# Patient Record
Sex: FEMALE | Race: WHITE | Employment: OTHER | URBAN - METROPOLITAN AREA
[De-identification: names, ages, dates, MRNs, and addresses within clinical notes are randomized per-mention and may not be internally consistent; named-entity substitution may affect disease eponyms.]

---

## 2017-03-15 ENCOUNTER — HOSPITAL ENCOUNTER (EMERGENCY)
Facility: HOSPITAL | Age: 82
Discharge: HOME | End: 2017-03-15
Attending: EMERGENCY MEDICINE
Payer: MEDICARE

## 2017-03-15 ENCOUNTER — APPOINTMENT (OUTPATIENT)
Dept: CT IMAGING | Facility: HOSPITAL | Age: 82
End: 2017-03-15
Payer: MEDICARE

## 2017-03-15 VITALS
HEIGHT: 64 IN | WEIGHT: 130 LBS | BODY MASS INDEX: 22.2 KG/M2 | OXYGEN SATURATION: 100 % | SYSTOLIC BLOOD PRESSURE: 169 MMHG | RESPIRATION RATE: 20 BRPM | TEMPERATURE: 98 F | DIASTOLIC BLOOD PRESSURE: 62 MMHG | HEART RATE: 76 BPM

## 2017-03-15 DIAGNOSIS — E87.1 HYPONATREMIA: ICD-10-CM

## 2017-03-15 DIAGNOSIS — R42 DIZZINESS: Primary | ICD-10-CM

## 2017-03-15 LAB
ANION GAP SERPL CALC-SCNC: 9 MMOL/L (ref 0–18)
BASOPHILS # BLD: 0.1 K/UL (ref 0–0.2)
BASOPHILS NFR BLD: 1 %
BILIRUB UR QL: NEGATIVE
BUN SERPL-MCNC: 33 MG/DL (ref 8–20)
BUN/CREAT SERPL: 28.9 (ref 10–20)
CALCIUM SERPL-MCNC: 9.1 MG/DL (ref 8.5–10.5)
CHLORIDE SERPL-SCNC: 92 MMOL/L (ref 95–110)
CLARITY UR: CLEAR
CO2 SERPL-SCNC: 25 MMOL/L (ref 22–32)
COLOR UR: COLORLESS
CREAT SERPL-MCNC: 1.14 MG/DL (ref 0.5–1.5)
CREAT UR-MCNC: 21 MG/DL
EOSINOPHIL # BLD: 0.1 K/UL (ref 0–0.7)
EOSINOPHIL NFR BLD: 1 %
ERYTHROCYTE [DISTWIDTH] IN BLOOD BY AUTOMATED COUNT: 14 % (ref 11–15)
GLUCOSE SERPL-MCNC: 105 MG/DL (ref 70–99)
GLUCOSE UR-MCNC: NEGATIVE MG/DL
HCT VFR BLD AUTO: 31.6 % (ref 35–48)
HGB BLD-MCNC: 10.6 G/DL (ref 12–16)
KETONES UR-MCNC: NEGATIVE MG/DL
LYMPHOCYTES # BLD: 1.2 K/UL (ref 1–4)
LYMPHOCYTES NFR BLD: 17 %
MAGNESIUM SERPL-MCNC: 1.9 MG/DL (ref 1.8–2.5)
MCH RBC QN AUTO: 28.7 PG (ref 27–32)
MCHC RBC AUTO-ENTMCNC: 33.6 G/DL (ref 32–37)
MCV RBC AUTO: 85.3 FL (ref 80–100)
MONOCYTES # BLD: 0.9 K/UL (ref 0–1)
MONOCYTES NFR BLD: 12 %
NEUTROPHILS # BLD AUTO: 5.2 K/UL (ref 1.8–7.7)
NEUTROPHILS NFR BLD: 70 %
NITRITE UR QL STRIP.AUTO: NEGATIVE
OSMOLALITY UR CALC.SUM OF ELEC: 270 MOSM/KG (ref 275–295)
PH UR: 5 [PH] (ref 5–8)
PLATELET # BLD AUTO: 239 K/UL (ref 140–400)
PMV BLD AUTO: 8.3 FL (ref 7.4–10.3)
POTASSIUM SERPL-SCNC: 4 MMOL/L (ref 3.3–5.1)
PROT UR-MCNC: NEGATIVE MG/DL
RBC # BLD AUTO: 3.7 M/UL (ref 3.7–5.4)
RBC #/AREA URNS AUTO: 0 /HPF
SODIUM SERPL-SCNC: 126 MMOL/L (ref 136–144)
SODIUM UR-SCNC: 46 MMOL/L
SP GR UR STRIP: 1.01 (ref 1–1.03)
TROPONIN I SERPL-MCNC: 0 NG/ML (ref ?–0.03)
UROBILINOGEN UR STRIP-ACNC: <2
VIT C UR-MCNC: NEGATIVE MG/DL
WBC # BLD AUTO: 7.4 K/UL (ref 4–11)
WBC #/AREA URNS AUTO: 6 /HPF

## 2017-03-15 PROCEDURE — 93010 ELECTROCARDIOGRAM REPORT: CPT | Performed by: EMERGENCY MEDICINE

## 2017-03-15 PROCEDURE — 81001 URINALYSIS AUTO W/SCOPE: CPT | Performed by: EMERGENCY MEDICINE

## 2017-03-15 PROCEDURE — 82570 ASSAY OF URINE CREATININE: CPT | Performed by: EMERGENCY MEDICINE

## 2017-03-15 PROCEDURE — 99285 EMERGENCY DEPT VISIT HI MDM: CPT

## 2017-03-15 PROCEDURE — 84300 ASSAY OF URINE SODIUM: CPT | Performed by: EMERGENCY MEDICINE

## 2017-03-15 PROCEDURE — 85025 COMPLETE CBC W/AUTO DIFF WBC: CPT | Performed by: EMERGENCY MEDICINE

## 2017-03-15 PROCEDURE — 80048 BASIC METABOLIC PNL TOTAL CA: CPT | Performed by: EMERGENCY MEDICINE

## 2017-03-15 PROCEDURE — 87086 URINE CULTURE/COLONY COUNT: CPT | Performed by: EMERGENCY MEDICINE

## 2017-03-15 PROCEDURE — 93005 ELECTROCARDIOGRAM TRACING: CPT

## 2017-03-15 PROCEDURE — 84484 ASSAY OF TROPONIN QUANT: CPT | Performed by: EMERGENCY MEDICINE

## 2017-03-15 PROCEDURE — 70450 CT HEAD/BRAIN W/O DYE: CPT

## 2017-03-15 PROCEDURE — 36415 COLL VENOUS BLD VENIPUNCTURE: CPT

## 2017-03-15 PROCEDURE — 83735 ASSAY OF MAGNESIUM: CPT | Performed by: EMERGENCY MEDICINE

## 2017-03-15 RX ORDER — SODIUM CHLORIDE 1000 MG
1 TABLET, SOLUBLE MISCELLANEOUS 2 TIMES DAILY WITH MEALS
Qty: 10 TABLET | Refills: 0 | Status: SHIPPED | OUTPATIENT
Start: 2017-03-15 | End: 2017-03-20

## 2017-03-15 NOTE — ED INITIAL ASSESSMENT (HPI)
Pt reports two episodes of dizziness and HA. Pt reports last episode was yesterday.  Pt had labs done left week and all was normal.

## 2017-03-15 NOTE — ED NOTES
Post care note/summary  Pt here for intermittent dizziness. 1x last week and 1x yesterday, no acutely precipitating events. Pt does have hx of vertigo.  Connected to the monitor, straight draw for blood, assisted to br for urine sample which is still pendin

## 2017-03-18 NOTE — ED PROVIDER NOTES
Patient Seen in: [facility] AND [facility] Emergency Department    History   Patient presents with:  Dizziness (neurologic)    Stated Complaint: DIZZINESS/HEADACHE    HPI    14-year-old female presents for evaluation of dizziness.   Patient reports 2 isolated epi Eyes: Conjunctivae and EOM are normal.   Neck: Normal range of motion. Neck supple. Cardiovascular: Normal rate, regular rhythm, normal heart sounds and intact distal pulses.     Pulmonary/Chest: Effort normal and breath sounds normal. No respiratory di Final result                 Please view results for these tests on the individual orders.    SODIUM, URINE, RANDOM   CREATININE, URINE, RANDOM   RAINBOW DRAW BLUE   RAINBOW DRAW GOLD   RAINBOW DRAW LAVENDER   RAINBOW DRAW LIGHT GREEN   RAINBOW DR

## 2017-05-23 ENCOUNTER — OFFICE VISIT (OUTPATIENT)
Dept: OTOLARYNGOLOGY | Facility: CLINIC | Age: 82
End: 2017-05-23

## 2017-05-23 VITALS
DIASTOLIC BLOOD PRESSURE: 60 MMHG | TEMPERATURE: 99 F | HEART RATE: 81 BPM | SYSTOLIC BLOOD PRESSURE: 153 MMHG | RESPIRATION RATE: 22 BRPM

## 2017-05-23 DIAGNOSIS — H61.23 BILATERAL IMPACTED CERUMEN: Primary | ICD-10-CM

## 2017-05-23 PROCEDURE — 69210 REMOVE IMPACTED EAR WAX UNI: CPT | Performed by: OTOLARYNGOLOGY

## 2017-05-23 PROCEDURE — 99203 OFFICE O/P NEW LOW 30 MIN: CPT | Performed by: OTOLARYNGOLOGY

## 2017-05-23 RX ORDER — VALSARTAN 80 MG/1
TABLET ORAL
Refills: 0 | Status: ON HOLD | COMMUNITY
Start: 2017-05-12 | End: 2021-01-01

## 2017-05-23 RX ORDER — CHOLECALCIFEROL (VITAMIN D3) 125 MCG
CAPSULE ORAL
Status: ON HOLD | COMMUNITY
End: 2021-01-01

## 2017-05-23 RX ORDER — MAG HYDROX/ALUMINUM HYD/SIMETH 400-400-40
SUSPENSION, ORAL (FINAL DOSE FORM) ORAL
Refills: 6 | COMMUNITY
Start: 2017-03-09

## 2017-05-23 RX ORDER — VALSARTAN AND HYDROCHLOROTHIAZIDE 80; 12.5 MG/1; MG/1
TABLET, FILM COATED ORAL
COMMUNITY
Start: 2012-11-26 | End: 2017-05-23

## 2017-05-23 RX ORDER — FERROUS SULFATE 325(65) MG
325 TABLET ORAL
Refills: 2 | COMMUNITY
Start: 2017-03-14

## 2017-05-23 RX ORDER — GABAPENTIN 300 MG/1
CAPSULE ORAL NIGHTLY
COMMUNITY
Start: 2012-11-26

## 2017-05-23 RX ORDER — CLOPIDOGREL BISULFATE 75 MG/1
TABLET ORAL
Status: ON HOLD | COMMUNITY
Start: 2015-09-29 | End: 2021-01-01

## 2017-05-23 RX ORDER — AMITRIPTYLINE HYDROCHLORIDE 10 MG/1
10 TABLET, FILM COATED ORAL NIGHTLY
Status: ON HOLD | COMMUNITY
Start: 2012-11-26 | End: 2021-01-01

## 2017-05-24 NOTE — PROGRESS NOTES
Chapin Reyes is a 80year old female. Patient presents with:  Ear Problem: pcp found scab in L ear during cleaning; pt on blood thinners    HPI:   She wears hearing aids and has done so for many years.  She was noted by her hearing aid specialist to Normal Memory - Normal. Cranial nerves - Cranial nerves II through XII grossly intact.    Neck Exam Normal Inspection - Normal. Palpation - Normal. Parotid gland - Normal. Thyroid gland - Normal.   Psychiatric Normal Orientation - Oriented to time, place, p

## 2018-06-14 ENCOUNTER — HOSPITAL ENCOUNTER (OUTPATIENT)
Dept: ULTRASOUND IMAGING | Facility: HOSPITAL | Age: 83
Discharge: HOME | End: 2018-06-14
Attending: INTERNAL MEDICINE
Payer: MEDICARE

## 2018-06-14 DIAGNOSIS — G45.9 BRAIN TIA: ICD-10-CM

## 2018-06-14 PROCEDURE — 93880 EXTRACRANIAL BILAT STUDY: CPT | Performed by: INTERNAL MEDICINE

## 2018-06-18 ENCOUNTER — HOSPITAL ENCOUNTER (OUTPATIENT)
Dept: MRI IMAGING | Facility: HOSPITAL | Age: 83
Discharge: HOME | End: 2018-06-18
Attending: INTERNAL MEDICINE
Payer: MEDICARE

## 2018-06-18 DIAGNOSIS — G45.9 BRAIN TIA: ICD-10-CM

## 2018-06-18 PROCEDURE — 82565 ASSAY OF CREATININE: CPT

## 2018-06-18 PROCEDURE — 70553 MRI BRAIN STEM W/O & W/DYE: CPT | Performed by: INTERNAL MEDICINE

## 2018-06-18 PROCEDURE — A9576 INJ PROHANCE MULTIPACK: HCPCS | Performed by: RADIOLOGY

## 2018-06-21 ENCOUNTER — APPOINTMENT (OUTPATIENT)
Dept: GENERAL RADIOLOGY | Facility: HOSPITAL | Age: 83
End: 2018-06-21
Attending: EMERGENCY MEDICINE
Payer: MEDICARE

## 2018-06-21 ENCOUNTER — PRIOR ORIGINAL RECORDS (OUTPATIENT)
Dept: OTHER | Age: 83
End: 2018-06-21

## 2018-06-21 ENCOUNTER — HOSPITAL ENCOUNTER (EMERGENCY)
Facility: HOSPITAL | Age: 83
Discharge: HOME | End: 2018-06-21
Attending: EMERGENCY MEDICINE
Payer: MEDICARE

## 2018-06-21 VITALS
HEART RATE: 66 BPM | TEMPERATURE: 98 F | WEIGHT: 127 LBS | SYSTOLIC BLOOD PRESSURE: 194 MMHG | OXYGEN SATURATION: 100 % | HEIGHT: 62 IN | BODY MASS INDEX: 23.37 KG/M2 | DIASTOLIC BLOOD PRESSURE: 74 MMHG | RESPIRATION RATE: 16 BRPM

## 2018-06-21 DIAGNOSIS — I10 ESSENTIAL HYPERTENSION: Primary | ICD-10-CM

## 2018-06-21 PROCEDURE — 93010 ELECTROCARDIOGRAM REPORT: CPT | Performed by: EMERGENCY MEDICINE

## 2018-06-21 PROCEDURE — 84484 ASSAY OF TROPONIN QUANT: CPT | Performed by: EMERGENCY MEDICINE

## 2018-06-21 PROCEDURE — 36415 COLL VENOUS BLD VENIPUNCTURE: CPT

## 2018-06-21 PROCEDURE — 99285 EMERGENCY DEPT VISIT HI MDM: CPT

## 2018-06-21 PROCEDURE — 93005 ELECTROCARDIOGRAM TRACING: CPT

## 2018-06-21 PROCEDURE — 80048 BASIC METABOLIC PNL TOTAL CA: CPT | Performed by: EMERGENCY MEDICINE

## 2018-06-21 PROCEDURE — 71045 X-RAY EXAM CHEST 1 VIEW: CPT | Performed by: EMERGENCY MEDICINE

## 2018-06-21 PROCEDURE — 85025 COMPLETE CBC W/AUTO DIFF WBC: CPT | Performed by: EMERGENCY MEDICINE

## 2018-06-21 RX ORDER — VALSARTAN 320 MG/1
320 TABLET ORAL DAILY
Qty: 30 TABLET | Refills: 0 | Status: ON HOLD | OUTPATIENT
Start: 2018-06-21 | End: 2021-01-01

## 2018-06-21 RX ORDER — VALSARTAN 320 MG/1
160 TABLET ORAL ONCE
Status: DISCONTINUED | OUTPATIENT
Start: 2018-06-21 | End: 2018-06-21 | Stop reason: RX

## 2018-06-21 RX ORDER — LOSARTAN POTASSIUM 100 MG/1
100 TABLET ORAL ONCE
Status: COMPLETED | OUTPATIENT
Start: 2018-06-21 | End: 2018-06-21

## 2018-06-21 RX ORDER — ACETAMINOPHEN 500 MG
1000 TABLET ORAL ONCE
Status: COMPLETED | OUTPATIENT
Start: 2018-06-21 | End: 2018-06-21

## 2018-06-21 NOTE — PROGRESS NOTES
Therapeutic Substitution Note    Valsartan 160mg is non-formulary and was auto-substituted to Losartan 100mg per [employer] Policy.     Humble Aguirre, 06/21/18, 5:30 PM

## 2018-06-21 NOTE — ED INITIAL ASSESSMENT (HPI)
Via medics from home, patient instructed by her PCP to check her BP three times a day and noted to have /90.  Saw her PCP twice in the last week for this issue    C/o headache, no other symptoms

## 2018-06-21 NOTE — ED PROVIDER NOTES
Patient Seen in: [facility] AND [facility] Emergency Department    History   Patient presents with:  Hypertension (cardiovascular)    Stated Complaint: hypertension    HPI    20-year-old female with history of hypertension, prior CVA, and peripheral neuropathy p 69  Resp: 14  Temp: 97.9 °F (36.6 °C)  Temp src: Oral  SpO2: 100 %  O2 Device: None (Room air)    Current:BP (!) 194/74   Pulse 66   Temp 97.9 °F (36.6 °C) (Oral)   Resp 16   Ht 157.5 cm (5' 2\")   Wt 57.6 kg   SpO2 100%   BMI 23.23 kg/m²         Physical RAINBOW DRAW LAVENDER   RAINBOW DRAW DARK GREEN   RAINBOW DRAW LIGHT GREEN   RAINBOW DRAW GOLD   RAINBOW DRAW LAVENDER TALL (BNP)     EKG    Rate, intervals and axes as noted on EKG Report.   Rate: 65  Rhythm: Sinus Rhythm  Axis: Normal  Reading: Normal E

## 2018-06-23 ENCOUNTER — HOSPITAL ENCOUNTER (EMERGENCY)
Facility: HOSPITAL | Age: 83
Discharge: HOME | End: 2018-06-23
Attending: EMERGENCY MEDICINE
Payer: MEDICARE

## 2018-06-23 VITALS
RESPIRATION RATE: 17 BRPM | OXYGEN SATURATION: 98 % | DIASTOLIC BLOOD PRESSURE: 62 MMHG | TEMPERATURE: 99 F | SYSTOLIC BLOOD PRESSURE: 158 MMHG | HEART RATE: 77 BPM

## 2018-06-23 DIAGNOSIS — I10 ESSENTIAL HYPERTENSION: Primary | ICD-10-CM

## 2018-06-23 PROCEDURE — 93010 ELECTROCARDIOGRAM REPORT: CPT | Performed by: EMERGENCY MEDICINE

## 2018-06-23 PROCEDURE — 99284 EMERGENCY DEPT VISIT MOD MDM: CPT

## 2018-06-23 PROCEDURE — 96374 THER/PROPH/DIAG INJ IV PUSH: CPT

## 2018-06-23 PROCEDURE — 93005 ELECTROCARDIOGRAM TRACING: CPT

## 2018-06-23 RX ORDER — ACETAMINOPHEN 500 MG
1000 TABLET ORAL ONCE
Status: COMPLETED | OUTPATIENT
Start: 2018-06-23 | End: 2018-06-23

## 2018-06-23 RX ORDER — HYDRALAZINE HYDROCHLORIDE 20 MG/ML
10 INJECTION INTRAMUSCULAR; INTRAVENOUS ONCE
Status: COMPLETED | OUTPATIENT
Start: 2018-06-23 | End: 2018-06-23

## 2018-06-23 RX ORDER — ACETAMINOPHEN 500 MG
TABLET ORAL
Status: COMPLETED
Start: 2018-06-23 | End: 2018-06-23

## 2018-06-24 NOTE — ED NOTES
Pt resting comfortably. Pt states that she is having a slight headache, and states that she still feels \"a little fuzzy\".  Pts didinati remains negative

## 2018-06-24 NOTE — ED PROVIDER NOTES
Patient Seen in: [facility] AND [facility] Emergency Department    History   Patient presents with:  Hypertension (cardiovascular)    Stated Complaint:     HPI    Patient is an 31-year-old female who arrives by EMS for continued high blood pressure issues.   Niurka cyanosis/clubbing/edema  Neuro: CN intact, normal speech, normal gait, 5/5 motor strength in all extremities, no focal deficits  SKIN: warm, dry, no rashes        ED Course   Labs Reviewed - No data to display  EKG    Rate, intervals and axes as noted on E

## 2018-06-24 NOTE — ED INITIAL ASSESSMENT (HPI)
Care assumed from ems. Pt c/o high blood pressure. Pt states that she was in the er recently and her valsartan dose was increased to 320mg. Pt c/o feeling \"fuzzy\", but denies headache. Pt denies diplopia, blurry vision, or weakness. Pupils are perll.  rambo

## 2018-06-29 LAB
BUN: 19 MG/DL
CALCIUM: 9.3 MG/DL
CHLORIDE: 100 MEQ/L
CREATININE, SERUM: 1.1 MG/DL
GLUCOSE: 105 MG/DL
HEMATOCRIT: 34.5 %
HEMOGLOBIN: 11.8 G/DL
MONOCYTES (ABSOLUTE): 1.1 X 10-3/U
PLATELETS: 185 K/UL
POTASSIUM, SERUM: 4.2 MEQ/L
RED BLOOD COUNT: 3.97 X 10-6/U
SODIUM: 130 MEQ/L
WHITE BLOOD COUNT: 5.6 X 10-3/U

## 2018-07-02 ENCOUNTER — PRIOR ORIGINAL RECORDS (OUTPATIENT)
Dept: OTHER | Age: 83
End: 2018-07-02

## 2018-07-07 ENCOUNTER — APPOINTMENT (OUTPATIENT)
Dept: ULTRASOUND IMAGING | Facility: HOSPITAL | Age: 83
End: 2018-07-07
Payer: MEDICARE

## 2018-07-07 ENCOUNTER — HOSPITAL ENCOUNTER (EMERGENCY)
Facility: HOSPITAL | Age: 83
Discharge: HOME | End: 2018-07-07
Attending: EMERGENCY MEDICINE
Payer: MEDICARE

## 2018-07-07 VITALS
OXYGEN SATURATION: 100 % | TEMPERATURE: 98 F | SYSTOLIC BLOOD PRESSURE: 142 MMHG | HEART RATE: 54 BPM | HEIGHT: 63 IN | WEIGHT: 124 LBS | RESPIRATION RATE: 16 BRPM | DIASTOLIC BLOOD PRESSURE: 49 MMHG | BODY MASS INDEX: 21.97 KG/M2

## 2018-07-07 DIAGNOSIS — M25.471 ANKLE SWELLING, RIGHT: Primary | ICD-10-CM

## 2018-07-07 PROCEDURE — 93971 EXTREMITY STUDY: CPT | Performed by: EMERGENCY MEDICINE

## 2018-07-07 PROCEDURE — 99284 EMERGENCY DEPT VISIT MOD MDM: CPT

## 2018-07-07 RX ORDER — METOPROLOL SUCCINATE 25 MG/1
25 TABLET, EXTENDED RELEASE ORAL DAILY
Status: ON HOLD | COMMUNITY
End: 2021-01-01

## 2018-07-07 RX ORDER — AMLODIPINE BESYLATE 10 MG/1
5 TABLET ORAL DAILY
Status: ON HOLD | COMMUNITY
End: 2021-01-01

## 2018-07-08 NOTE — ED PROVIDER NOTES
Patient Seen in: [facility] AND [facility] Emergency Department    History   Patient presents with:  Deep Vein Thrombosis (cardiovascular)    Stated Complaint:     HPI    79-year-old female presents the emergency department with swelling to the right ankle that heart sounds and intact distal pulses. Pulmonary/Chest: Effort normal and breath sounds normal.   Abdominal: Soft. Bowel sounds are normal. She exhibits no distension and no mass. There is no tenderness. There is no rebound and no guarding.    Musculoske

## 2018-07-09 ENCOUNTER — PRIOR ORIGINAL RECORDS (OUTPATIENT)
Dept: OTHER | Age: 83
End: 2018-07-09

## 2018-07-27 ENCOUNTER — PRIOR ORIGINAL RECORDS (OUTPATIENT)
Dept: OTHER | Age: 83
End: 2018-07-27

## 2018-08-24 ENCOUNTER — PRIOR ORIGINAL RECORDS (OUTPATIENT)
Dept: OTHER | Age: 83
End: 2018-08-24

## 2018-08-24 ENCOUNTER — MYAURORA ACCOUNT LINK (OUTPATIENT)
Dept: OTHER | Age: 83
End: 2018-08-24

## 2018-11-01 ENCOUNTER — HOSPITAL ENCOUNTER (EMERGENCY)
Facility: HOSPITAL | Age: 83
Discharge: HOME | End: 2018-11-01
Attending: EMERGENCY MEDICINE
Payer: MEDICARE

## 2018-11-01 VITALS
SYSTOLIC BLOOD PRESSURE: 157 MMHG | TEMPERATURE: 98 F | DIASTOLIC BLOOD PRESSURE: 59 MMHG | WEIGHT: 126 LBS | HEIGHT: 63 IN | RESPIRATION RATE: 18 BRPM | BODY MASS INDEX: 22.32 KG/M2 | HEART RATE: 68 BPM | OXYGEN SATURATION: 100 %

## 2018-11-01 DIAGNOSIS — L50.9 URTICARIA: Primary | ICD-10-CM

## 2018-11-01 PROCEDURE — 99284 EMERGENCY DEPT VISIT MOD MDM: CPT

## 2018-11-01 RX ORDER — DIPHENHYDRAMINE HCL 25 MG
25 CAPSULE ORAL ONCE
Status: COMPLETED | OUTPATIENT
Start: 2018-11-01 | End: 2018-11-01

## 2018-11-01 RX ORDER — PREDNISONE 20 MG/1
40 TABLET ORAL DAILY
Qty: 6 TABLET | Refills: 0 | Status: SHIPPED | OUTPATIENT
Start: 2018-11-02 | End: 2018-11-05

## 2018-11-01 RX ORDER — PREDNISONE 20 MG/1
40 TABLET ORAL ONCE
Status: COMPLETED | OUTPATIENT
Start: 2018-11-01 | End: 2018-11-01

## 2018-11-02 NOTE — ED PROVIDER NOTES
Patient Seen in: [facility] AND [facility] Emergency Department    History   Patient presents with:  Rash Skin Problem (integumentary)    Stated Complaint: rash    HPI    The patient is a 80-year-old female who per presents with a diffuse pruritic rash since thi membranes are normal. No uvula swelling. No angioedema or change in voice   Neck: No JVD present. Cardiovascular: Normal rate, regular rhythm and intact distal pulses. Pulmonary/Chest: Effort normal and breath sounds normal. She has no wheezes.    Tameka Rodarte

## 2019-02-22 ENCOUNTER — PRIOR ORIGINAL RECORDS (OUTPATIENT)
Dept: OTHER | Age: 84
End: 2019-02-22

## 2019-02-28 VITALS
BODY MASS INDEX: 22.15 KG/M2 | HEART RATE: 49 BPM | WEIGHT: 125 LBS | SYSTOLIC BLOOD PRESSURE: 120 MMHG | HEIGHT: 63 IN | DIASTOLIC BLOOD PRESSURE: 48 MMHG | OXYGEN SATURATION: 97 %

## 2019-02-28 VITALS — OXYGEN SATURATION: 82 % | WEIGHT: 119 LBS | SYSTOLIC BLOOD PRESSURE: 110 MMHG | DIASTOLIC BLOOD PRESSURE: 60 MMHG

## 2019-02-28 VITALS
HEART RATE: 68 BPM | OXYGEN SATURATION: 99 % | HEIGHT: 63 IN | SYSTOLIC BLOOD PRESSURE: 124 MMHG | WEIGHT: 124 LBS | DIASTOLIC BLOOD PRESSURE: 54 MMHG | BODY MASS INDEX: 21.97 KG/M2

## 2019-03-07 VITALS
HEIGHT: 63 IN | DIASTOLIC BLOOD PRESSURE: 48 MMHG | BODY MASS INDEX: 21.44 KG/M2 | SYSTOLIC BLOOD PRESSURE: 124 MMHG | HEART RATE: 63 BPM | WEIGHT: 121 LBS

## 2019-04-09 RX ORDER — METOPROLOL SUCCINATE 25 MG/1
TABLET, EXTENDED RELEASE ORAL
Qty: 30 TABLET | Refills: 6 | Status: SHIPPED | OUTPATIENT
Start: 2019-04-09 | End: 2019-08-08

## 2019-04-10 RX ORDER — FERROUS SULFATE 325(65) MG
TABLET ORAL
COMMUNITY
End: 2019-08-08

## 2019-04-10 RX ORDER — AMITRIPTYLINE HYDROCHLORIDE 10 MG/1
TABLET, FILM COATED ORAL
COMMUNITY
End: 2019-08-08

## 2019-04-10 RX ORDER — POTASSIUM CHLORIDE 20 MEQ/1
TABLET, EXTENDED RELEASE ORAL
COMMUNITY
End: 2019-08-08

## 2019-04-10 RX ORDER — FUROSEMIDE 20 MG/1
TABLET ORAL
COMMUNITY
End: 2019-08-08

## 2019-04-10 RX ORDER — CLOPIDOGREL BISULFATE 75 MG/1
TABLET ORAL
COMMUNITY
End: 2019-08-08

## 2019-04-10 RX ORDER — AMLODIPINE BESYLATE 5 MG/1
TABLET ORAL
COMMUNITY
End: 2019-08-08

## 2019-04-10 RX ORDER — VALSARTAN 320 MG/1
TABLET ORAL
COMMUNITY
End: 2019-08-08

## 2019-08-08 PROBLEM — R60.0 LOCALIZED EDEMA: Status: ACTIVE | Noted: 2019-08-08

## 2019-08-08 PROBLEM — I35.0 AORTIC STENOSIS: Status: ACTIVE | Noted: 2019-08-08

## 2019-08-08 PROBLEM — I10 ESSENTIAL HYPERTENSION: Status: ACTIVE | Noted: 2019-08-08

## 2019-08-08 PROBLEM — E78.5 HYPERLIPIDEMIA: Status: ACTIVE | Noted: 2019-08-08

## 2019-08-08 PROBLEM — R06.02 SOB (SHORTNESS OF BREATH): Status: ACTIVE | Noted: 2019-08-08

## 2019-08-08 PROBLEM — R94.31 ABNORMAL EKG: Status: ACTIVE | Noted: 2019-08-08

## 2019-08-08 RX ORDER — FUROSEMIDE 20 MG/1
20 TABLET ORAL DAILY
COMMUNITY
Start: 2018-09-10 | End: 2019-08-09

## 2019-08-08 RX ORDER — POTASSIUM CHLORIDE 20 MEQ/1
20 TABLET, EXTENDED RELEASE ORAL DAILY
COMMUNITY
Start: 2018-08-24 | End: 2019-08-09

## 2019-08-08 RX ORDER — AMITRIPTYLINE HYDROCHLORIDE 10 MG/1
10 TABLET, FILM COATED ORAL EVERY OTHER DAY
COMMUNITY
Start: 2018-07-02

## 2019-08-08 RX ORDER — VALSARTAN 320 MG/1
320 TABLET ORAL DAILY
COMMUNITY
Start: 2018-07-02

## 2019-08-08 RX ORDER — ASPIRIN 81 MG/1
81 TABLET ORAL DAILY
COMMUNITY
Start: 2018-07-02 | End: 2021-03-03 | Stop reason: ALTCHOICE

## 2019-08-08 RX ORDER — FERROUS SULFATE 325(65) MG
325 TABLET ORAL 3 TIMES DAILY
COMMUNITY
Start: 2018-07-02

## 2019-08-08 RX ORDER — METOPROLOL SUCCINATE 25 MG/1
25 TABLET, EXTENDED RELEASE ORAL DAILY
COMMUNITY
Start: 2018-09-04 | End: 2019-10-31 | Stop reason: SDUPTHER

## 2019-08-08 RX ORDER — CLOPIDOGREL BISULFATE 75 MG/1
75 TABLET ORAL DAILY
COMMUNITY
Start: 2018-07-02

## 2019-08-08 RX ORDER — AMLODIPINE BESYLATE 5 MG/1
5 TABLET ORAL DAILY
COMMUNITY
Start: 2018-07-27

## 2019-08-09 ENCOUNTER — OFFICE VISIT (OUTPATIENT)
Dept: CARDIOLOGY | Age: 84
End: 2019-08-09

## 2019-08-09 VITALS
HEART RATE: 57 BPM | HEIGHT: 63 IN | WEIGHT: 121 LBS | DIASTOLIC BLOOD PRESSURE: 52 MMHG | SYSTOLIC BLOOD PRESSURE: 120 MMHG | OXYGEN SATURATION: 96 % | BODY MASS INDEX: 21.44 KG/M2

## 2019-08-09 DIAGNOSIS — I35.0 NONRHEUMATIC AORTIC VALVE STENOSIS: Primary | ICD-10-CM

## 2019-08-09 PROCEDURE — 99214 OFFICE O/P EST MOD 30 MIN: CPT | Performed by: INTERNAL MEDICINE

## 2019-08-09 SDOH — HEALTH STABILITY: MENTAL HEALTH: HOW OFTEN DO YOU HAVE A DRINK CONTAINING ALCOHOL?: NEVER

## 2019-10-11 ENCOUNTER — ANCILLARY PROCEDURE (OUTPATIENT)
Dept: CARDIOLOGY | Age: 84
End: 2019-10-11
Attending: INTERNAL MEDICINE

## 2019-10-11 DIAGNOSIS — I35.0 NONRHEUMATIC AORTIC VALVE STENOSIS: ICD-10-CM

## 2019-10-11 PROCEDURE — 93306 TTE W/DOPPLER COMPLETE: CPT | Performed by: INTERNAL MEDICINE

## 2019-10-15 ENCOUNTER — TELEPHONE (OUTPATIENT)
Dept: CARDIOLOGY | Age: 84
End: 2019-10-15

## 2019-10-31 RX ORDER — METOPROLOL SUCCINATE 25 MG/1
TABLET, EXTENDED RELEASE ORAL
Qty: 30 TABLET | Refills: 6 | Status: SHIPPED | OUTPATIENT
Start: 2019-10-31 | End: 2020-05-26

## 2019-11-19 ENCOUNTER — OFFICE VISIT (OUTPATIENT)
Dept: OTOLARYNGOLOGY | Facility: CLINIC | Age: 84
End: 2019-11-19
Payer: MEDICARE

## 2019-11-19 VITALS
DIASTOLIC BLOOD PRESSURE: 58 MMHG | BODY MASS INDEX: 21.26 KG/M2 | TEMPERATURE: 98 F | WEIGHT: 120 LBS | SYSTOLIC BLOOD PRESSURE: 139 MMHG | HEIGHT: 63 IN

## 2019-11-19 DIAGNOSIS — H61.23 BILATERAL IMPACTED CERUMEN: Primary | ICD-10-CM

## 2019-11-19 PROCEDURE — 69210 REMOVE IMPACTED EAR WAX UNI: CPT | Performed by: OTOLARYNGOLOGY

## 2019-11-19 RX ORDER — MOMETASONE FUROATE 1 MG/G
CREAM TOPICAL
Qty: 15 G | Refills: 0 | Status: SHIPPED | OUTPATIENT
Start: 2019-11-19

## 2019-11-20 NOTE — PROGRESS NOTES
Aleisha Walker is a 80year old female.  Patient presents with:  Ear Wax: both ears    HPI:   Her ears feel like they are blocked with wax and her hearing aids are not working as well  Current Outpatient Medications   Medication Sig Dispense Refill   • Normal.   Head/Face Normal Facial features - Normal. Eyebrows - Normal. Skull - Normal.   Oral/Oropharynx Normal Lips - Normal, Tonsils - Normal, Tongue - Normal    Nasal Normal External nose - Normal. Nasal septum - Normal, Turbinates - Normal   Neurologi

## 2020-02-28 ENCOUNTER — OFFICE VISIT (OUTPATIENT)
Dept: CARDIOLOGY | Age: 85
End: 2020-02-28

## 2020-02-28 VITALS
WEIGHT: 125 LBS | DIASTOLIC BLOOD PRESSURE: 52 MMHG | BODY MASS INDEX: 23 KG/M2 | HEART RATE: 64 BPM | SYSTOLIC BLOOD PRESSURE: 138 MMHG | HEIGHT: 62 IN

## 2020-02-28 DIAGNOSIS — I10 ESSENTIAL HYPERTENSION: ICD-10-CM

## 2020-02-28 DIAGNOSIS — R06.02 SOB (SHORTNESS OF BREATH): Primary | ICD-10-CM

## 2020-02-28 DIAGNOSIS — E78.49 OTHER HYPERLIPIDEMIA: ICD-10-CM

## 2020-02-28 DIAGNOSIS — I35.0 NONRHEUMATIC AORTIC VALVE STENOSIS: ICD-10-CM

## 2020-02-28 PROCEDURE — 99214 OFFICE O/P EST MOD 30 MIN: CPT | Performed by: INTERNAL MEDICINE

## 2020-02-28 ASSESSMENT — PATIENT HEALTH QUESTIONNAIRE - PHQ9
2. FEELING DOWN, DEPRESSED OR HOPELESS: NOT AT ALL
SUM OF ALL RESPONSES TO PHQ9 QUESTIONS 1 AND 2: 0
SUM OF ALL RESPONSES TO PHQ9 QUESTIONS 1 AND 2: 0
1. LITTLE INTEREST OR PLEASURE IN DOING THINGS: NOT AT ALL

## 2020-05-26 RX ORDER — METOPROLOL SUCCINATE 25 MG/1
25 TABLET, EXTENDED RELEASE ORAL DAILY
Qty: 90 TABLET | Refills: 2 | Status: SHIPPED | OUTPATIENT
Start: 2020-05-26

## 2021-01-01 ENCOUNTER — APPOINTMENT (OUTPATIENT)
Dept: MRI IMAGING | Facility: HOSPITAL | Age: 86
DRG: 640 | End: 2021-01-01
Attending: EMERGENCY MEDICINE
Payer: MEDICARE

## 2021-01-01 ENCOUNTER — LAB REQUISITION (OUTPATIENT)
Dept: LAB | Age: 86
End: 2021-01-01
Payer: MEDICARE

## 2021-01-01 ENCOUNTER — EXTERNAL FACILITY (OUTPATIENT)
Dept: INTERNAL MEDICINE CLINIC | Facility: CLINIC | Age: 86
End: 2021-01-01

## 2021-01-01 ENCOUNTER — APPOINTMENT (OUTPATIENT)
Dept: CV DIAGNOSTICS | Facility: HOSPITAL | Age: 86
DRG: 644 | End: 2021-01-01
Attending: NURSE PRACTITIONER
Payer: MEDICARE

## 2021-01-01 ENCOUNTER — HOSPITAL ENCOUNTER (INPATIENT)
Facility: HOSPITAL | Age: 86
LOS: 19 days | Discharge: HOSPICE | DRG: 640 | End: 2021-01-01
Attending: EMERGENCY MEDICINE | Admitting: HOSPITALIST
Payer: MEDICARE

## 2021-01-01 ENCOUNTER — HOSPITAL ENCOUNTER (INPATIENT)
Facility: HOSPITAL | Age: 86
LOS: 11 days | Discharge: HOME HEALTH | DRG: 644 | End: 2021-01-01
Attending: EMERGENCY MEDICINE | Admitting: HOSPITALIST
Payer: MEDICARE

## 2021-01-01 ENCOUNTER — APPOINTMENT (OUTPATIENT)
Dept: GENERAL RADIOLOGY | Facility: HOSPITAL | Age: 86
DRG: 640 | End: 2021-01-01
Attending: INTERNAL MEDICINE
Payer: MEDICARE

## 2021-01-01 ENCOUNTER — APPOINTMENT (OUTPATIENT)
Dept: CT IMAGING | Facility: HOSPITAL | Age: 86
DRG: 640 | End: 2021-01-01
Attending: HOSPITALIST
Payer: MEDICARE

## 2021-01-01 ENCOUNTER — APPOINTMENT (OUTPATIENT)
Dept: GENERAL RADIOLOGY | Facility: HOSPITAL | Age: 86
DRG: 640 | End: 2021-01-01
Attending: HOSPITALIST
Payer: MEDICARE

## 2021-01-01 ENCOUNTER — APPOINTMENT (OUTPATIENT)
Dept: ULTRASOUND IMAGING | Facility: HOSPITAL | Age: 86
End: 2021-01-01
Attending: EMERGENCY MEDICINE
Payer: MEDICARE

## 2021-01-01 ENCOUNTER — APPOINTMENT (OUTPATIENT)
Dept: GENERAL RADIOLOGY | Facility: HOSPITAL | Age: 86
DRG: 640 | End: 2021-01-01
Attending: EMERGENCY MEDICINE
Payer: MEDICARE

## 2021-01-01 ENCOUNTER — HOSPITAL ENCOUNTER (INPATIENT)
Facility: HOSPITAL | Age: 86
LOS: 2 days | DRG: 640 | End: 2021-01-01
Attending: HOSPITALIST | Admitting: HOSPITALIST
Payer: OTHER MISCELLANEOUS

## 2021-01-01 ENCOUNTER — HOSPITAL ENCOUNTER (EMERGENCY)
Facility: HOSPITAL | Age: 86
Discharge: HOME | End: 2021-01-01
Attending: EMERGENCY MEDICINE
Payer: MEDICARE

## 2021-01-01 ENCOUNTER — APPOINTMENT (OUTPATIENT)
Dept: ULTRASOUND IMAGING | Facility: HOSPITAL | Age: 86
DRG: 644 | End: 2021-01-01
Attending: EMERGENCY MEDICINE
Payer: MEDICARE

## 2021-01-01 ENCOUNTER — APPOINTMENT (OUTPATIENT)
Dept: ULTRASOUND IMAGING | Facility: HOSPITAL | Age: 86
DRG: 640 | End: 2021-01-01
Attending: INTERNAL MEDICINE
Payer: MEDICARE

## 2021-01-01 ENCOUNTER — APPOINTMENT (OUTPATIENT)
Dept: CT IMAGING | Facility: HOSPITAL | Age: 86
DRG: 644 | End: 2021-01-01
Attending: EMERGENCY MEDICINE
Payer: MEDICARE

## 2021-01-01 ENCOUNTER — LAB ENCOUNTER (OUTPATIENT)
Dept: LAB | Facility: HOSPITAL | Age: 86
End: 2021-01-01
Attending: INTERNAL MEDICINE
Payer: MEDICARE

## 2021-01-01 ENCOUNTER — APPOINTMENT (OUTPATIENT)
Dept: MRI IMAGING | Facility: HOSPITAL | Age: 86
DRG: 644 | End: 2021-01-01
Attending: HOSPITALIST
Payer: MEDICARE

## 2021-01-01 ENCOUNTER — APPOINTMENT (OUTPATIENT)
Dept: GENERAL RADIOLOGY | Facility: HOSPITAL | Age: 86
End: 2021-01-01
Attending: EMERGENCY MEDICINE
Payer: MEDICARE

## 2021-01-01 ENCOUNTER — APPOINTMENT (OUTPATIENT)
Dept: CT IMAGING | Facility: HOSPITAL | Age: 86
DRG: 640 | End: 2021-01-01
Attending: EMERGENCY MEDICINE
Payer: MEDICARE

## 2021-01-01 ENCOUNTER — APPOINTMENT (OUTPATIENT)
Dept: GENERAL RADIOLOGY | Facility: HOSPITAL | Age: 86
DRG: 644 | End: 2021-01-01
Attending: ANESTHESIOLOGY
Payer: MEDICARE

## 2021-01-01 VITALS
RESPIRATION RATE: 17 BRPM | SYSTOLIC BLOOD PRESSURE: 137 MMHG | WEIGHT: 121 LBS | DIASTOLIC BLOOD PRESSURE: 69 MMHG | OXYGEN SATURATION: 100 % | HEART RATE: 64 BPM | TEMPERATURE: 98 F | HEIGHT: 63 IN | BODY MASS INDEX: 21.44 KG/M2

## 2021-01-01 VITALS
RESPIRATION RATE: 16 BRPM | WEIGHT: 126.88 LBS | HEART RATE: 69 BPM | BODY MASS INDEX: 23.35 KG/M2 | SYSTOLIC BLOOD PRESSURE: 148 MMHG | DIASTOLIC BLOOD PRESSURE: 61 MMHG | TEMPERATURE: 98 F | HEIGHT: 62 IN | OXYGEN SATURATION: 99 %

## 2021-01-01 VITALS
SYSTOLIC BLOOD PRESSURE: 143 MMHG | TEMPERATURE: 100 F | RESPIRATION RATE: 16 BRPM | HEART RATE: 84 BPM | DIASTOLIC BLOOD PRESSURE: 103 MMHG | OXYGEN SATURATION: 90 % | WEIGHT: 120 LBS | BODY MASS INDEX: 22 KG/M2

## 2021-01-01 VITALS
OXYGEN SATURATION: 94 % | RESPIRATION RATE: 16 BRPM | BODY MASS INDEX: 22 KG/M2 | DIASTOLIC BLOOD PRESSURE: 53 MMHG | SYSTOLIC BLOOD PRESSURE: 146 MMHG | TEMPERATURE: 99 F | WEIGHT: 120 LBS | HEART RATE: 78 BPM

## 2021-01-01 DIAGNOSIS — N17.9 AKI (ACUTE KIDNEY INJURY) (HCC): ICD-10-CM

## 2021-01-01 DIAGNOSIS — E87.2 METABOLIC ACIDOSIS: ICD-10-CM

## 2021-01-01 DIAGNOSIS — E87.1 HYPONATREMIA: Primary | ICD-10-CM

## 2021-01-01 DIAGNOSIS — N39.0 UTI (URINARY TRACT INFECTION): ICD-10-CM

## 2021-01-01 DIAGNOSIS — E86.0 DEHYDRATION: ICD-10-CM

## 2021-01-01 DIAGNOSIS — E87.1 HYPONATREMIA: ICD-10-CM

## 2021-01-01 DIAGNOSIS — R26.2 DIFFICULTY WALKING: ICD-10-CM

## 2021-01-01 DIAGNOSIS — R53.1 GENERALIZED WEAKNESS: ICD-10-CM

## 2021-01-01 DIAGNOSIS — R06.00 DYSPNEA, UNSPECIFIED TYPE: Primary | ICD-10-CM

## 2021-01-01 DIAGNOSIS — R60.0 BILATERAL LOWER EXTREMITY EDEMA: ICD-10-CM

## 2021-01-01 DIAGNOSIS — I48.91 ATRIAL FIBRILLATION, UNSPECIFIED TYPE (HCC): ICD-10-CM

## 2021-01-01 DIAGNOSIS — N39.0 UTI (URINARY TRACT INFECTION): Primary | ICD-10-CM

## 2021-01-01 DIAGNOSIS — M54.59 INTRACTABLE LOW BACK PAIN: ICD-10-CM

## 2021-01-01 DIAGNOSIS — E78.00 PURE HYPERCHOLESTEROLEMIA, UNSPECIFIED: ICD-10-CM

## 2021-01-01 DIAGNOSIS — K21.9 HIATAL HERNIA WITH GERD: ICD-10-CM

## 2021-01-01 DIAGNOSIS — K44.9 HIATAL HERNIA WITH GERD: ICD-10-CM

## 2021-01-01 DIAGNOSIS — N30.00 ACUTE CYSTITIS WITHOUT HEMATURIA: ICD-10-CM

## 2021-01-01 LAB
ADRENOCORTICOTROPIC HORMONE: 5.2 PG/ML
ALBUMIN SERPL-MCNC: 1.7 G/DL (ref 3.4–5)
ALBUMIN SERPL-MCNC: 1.8 G/DL (ref 3.4–5)
ALBUMIN SERPL-MCNC: 1.9 G/DL (ref 3.4–5)
ALBUMIN SERPL-MCNC: 2 G/DL (ref 3.4–5)
ALBUMIN SERPL-MCNC: 2.1 G/DL (ref 3.4–5)
ALBUMIN SERPL-MCNC: 3.3 G/DL (ref 3.4–5)
ALBUMIN SERPL-MCNC: 3.5 G/DL (ref 3.4–5)
ALBUMIN/GLOB SERPL: 0.6 {RATIO} (ref 1–2)
ALBUMIN/GLOB SERPL: 0.8 {RATIO} (ref 1–2)
ALBUMIN/GLOB SERPL: 1.5 {RATIO} (ref 1–2)
ALBUMIN/GLOB SERPL: 1.5 {RATIO} (ref 1–2)
ALP LIVER SERPL-CCNC: 52 U/L
ALP LIVER SERPL-CCNC: 60 U/L
ALP LIVER SERPL-CCNC: 60 U/L
ALP LIVER SERPL-CCNC: 67 U/L
ALT SERPL-CCNC: 22 U/L
ALT SERPL-CCNC: 43 U/L
ALT SERPL-CCNC: 51 U/L
ALT SERPL-CCNC: 75 U/L
ANION GAP SERPL CALC-SCNC: 10 MMOL/L (ref 0–18)
ANION GAP SERPL CALC-SCNC: 11 MMOL/L (ref 0–18)
ANION GAP SERPL CALC-SCNC: 12 MMOL/L (ref 0–18)
ANION GAP SERPL CALC-SCNC: 13 MMOL/L (ref 0–18)
ANION GAP SERPL CALC-SCNC: 16 MMOL/L (ref 0–18)
ANION GAP SERPL CALC-SCNC: 4 MMOL/L (ref 0–18)
ANION GAP SERPL CALC-SCNC: 7 MMOL/L (ref 0–18)
ANION GAP SERPL CALC-SCNC: 8 MMOL/L (ref 0–18)
ANION GAP SERPL CALC-SCNC: 9 MMOL/L (ref 0–18)
ANTIBODY SCREEN: NEGATIVE
ANTIBODY SCREEN: NEGATIVE
APTT PPP: 19 SECONDS (ref 23.2–35.3)
AST SERPL-CCNC: 32 U/L (ref 15–37)
AST SERPL-CCNC: 49 U/L (ref 15–37)
AST SERPL-CCNC: 71 U/L (ref 15–37)
AST SERPL-CCNC: 74 U/L (ref 15–37)
BASOPHILS # BLD AUTO: 0.02 X10(3) UL (ref 0–0.2)
BASOPHILS # BLD: 0 X10(3) UL (ref 0–0.2)
BASOPHILS # BLD: 0.12 X10(3) UL (ref 0–0.2)
BASOPHILS NFR BLD AUTO: 0.3 %
BASOPHILS NFR BLD: 0 %
BASOPHILS NFR BLD: 1 %
BILIRUB SERPL-MCNC: 0.3 MG/DL (ref 0.1–2)
BILIRUB SERPL-MCNC: 0.6 MG/DL (ref 0.1–2)
BILIRUB SERPL-MCNC: 1 MG/DL (ref 0.1–2)
BILIRUB SERPL-MCNC: 1.2 MG/DL (ref 0.1–2)
BILIRUB UR QL: NEGATIVE
BLOOD TYPE BARCODE: 5100
BLOOD TYPE BARCODE: 5100
BUN BLD-MCNC: 43 MG/DL (ref 7–18)
BUN BLD-MCNC: 44 MG/DL (ref 7–18)
BUN BLD-MCNC: 46 MG/DL (ref 7–18)
BUN BLD-MCNC: 47 MG/DL (ref 7–18)
BUN BLD-MCNC: 48 MG/DL (ref 7–18)
BUN BLD-MCNC: 51 MG/DL (ref 7–18)
BUN BLD-MCNC: 53 MG/DL (ref 7–18)
BUN BLD-MCNC: 54 MG/DL (ref 7–18)
BUN BLD-MCNC: 55 MG/DL (ref 7–18)
BUN BLD-MCNC: 60 MG/DL (ref 7–18)
BUN BLD-MCNC: 63 MG/DL (ref 7–18)
BUN BLD-MCNC: 64 MG/DL (ref 7–18)
BUN BLD-MCNC: 65 MG/DL (ref 7–18)
BUN BLD-MCNC: 65 MG/DL (ref 7–18)
BUN BLD-MCNC: 68 MG/DL (ref 7–18)
BUN BLD-MCNC: 70 MG/DL (ref 7–18)
BUN BLD-MCNC: 70 MG/DL (ref 7–18)
BUN BLD-MCNC: 71 MG/DL (ref 7–18)
BUN BLD-MCNC: 72 MG/DL (ref 7–18)
BUN BLD-MCNC: 73 MG/DL (ref 7–18)
BUN BLD-MCNC: 80 MG/DL (ref 7–18)
BUN/CREAT SERPL: 25.8 (ref 10–20)
BUN/CREAT SERPL: 28.5 (ref 10–20)
BUN/CREAT SERPL: 30.8 (ref 10–20)
BUN/CREAT SERPL: 31.8 (ref 10–20)
BUN/CREAT SERPL: 31.9 (ref 10–20)
BUN/CREAT SERPL: 33.3 (ref 10–20)
BUN/CREAT SERPL: 34.4 (ref 10–20)
BUN/CREAT SERPL: 34.6 (ref 10–20)
BUN/CREAT SERPL: 34.8 (ref 10–20)
BUN/CREAT SERPL: 35.8 (ref 10–20)
BUN/CREAT SERPL: 36.2 (ref 10–20)
BUN/CREAT SERPL: 37.1 (ref 10–20)
BUN/CREAT SERPL: 38 (ref 10–20)
BUN/CREAT SERPL: 38.2 (ref 10–20)
BUN/CREAT SERPL: 38.4 (ref 10–20)
BUN/CREAT SERPL: 38.9 (ref 10–20)
BUN/CREAT SERPL: 39.9 (ref 10–20)
BUN/CREAT SERPL: 40.4 (ref 10–20)
BUN/CREAT SERPL: 40.5 (ref 10–20)
BUN/CREAT SERPL: 40.8 (ref 10–20)
BUN/CREAT SERPL: 41.9 (ref 10–20)
CALCIUM BLD-MCNC: 10.1 MG/DL (ref 8.5–10.1)
CALCIUM BLD-MCNC: 10.3 MG/DL (ref 8.5–10.1)
CALCIUM BLD-MCNC: 8 MG/DL (ref 8.5–10.1)
CALCIUM BLD-MCNC: 8.2 MG/DL (ref 8.5–10.1)
CALCIUM BLD-MCNC: 8.3 MG/DL (ref 8.5–10.1)
CALCIUM BLD-MCNC: 8.5 MG/DL (ref 8.5–10.1)
CALCIUM BLD-MCNC: 8.5 MG/DL (ref 8.5–10.1)
CALCIUM BLD-MCNC: 8.6 MG/DL (ref 8.5–10.1)
CALCIUM BLD-MCNC: 8.7 MG/DL (ref 8.5–10.1)
CALCIUM BLD-MCNC: 8.8 MG/DL (ref 8.5–10.1)
CALCIUM BLD-MCNC: 9 MG/DL (ref 8.5–10.1)
CALCIUM BLD-MCNC: 9.1 MG/DL (ref 8.5–10.1)
CALCIUM BLD-MCNC: 9.2 MG/DL (ref 8.5–10.1)
CALCIUM BLD-MCNC: 9.3 MG/DL (ref 8.5–10.1)
CALCIUM BLD-MCNC: 9.3 MG/DL (ref 8.5–10.1)
CALCIUM BLD-MCNC: 9.6 MG/DL (ref 8.5–10.1)
CALCIUM BLD-MCNC: 9.7 MG/DL (ref 8.5–10.1)
CHLORIDE SERPL-SCNC: 100 MMOL/L (ref 98–112)
CHLORIDE SERPL-SCNC: 101 MMOL/L (ref 98–112)
CHLORIDE SERPL-SCNC: 102 MMOL/L (ref 98–112)
CHLORIDE SERPL-SCNC: 102 MMOL/L (ref 98–112)
CHLORIDE SERPL-SCNC: 103 MMOL/L (ref 98–112)
CHLORIDE SERPL-SCNC: 104 MMOL/L (ref 98–112)
CHLORIDE SERPL-SCNC: 105 MMOL/L (ref 98–112)
CHLORIDE SERPL-SCNC: 105 MMOL/L (ref 98–112)
CHLORIDE SERPL-SCNC: 106 MMOL/L (ref 98–112)
CHLORIDE SERPL-SCNC: 107 MMOL/L (ref 98–112)
CHLORIDE SERPL-SCNC: 111 MMOL/L (ref 98–112)
CHLORIDE SERPL-SCNC: 98 MMOL/L (ref 98–112)
CHOLEST SMN-MCNC: 126 MG/DL (ref ?–200)
CO2 SERPL-SCNC: 12 MMOL/L (ref 21–32)
CO2 SERPL-SCNC: 15 MMOL/L (ref 21–32)
CO2 SERPL-SCNC: 16 MMOL/L (ref 21–32)
CO2 SERPL-SCNC: 17 MMOL/L (ref 21–32)
CO2 SERPL-SCNC: 18 MMOL/L (ref 21–32)
CO2 SERPL-SCNC: 19 MMOL/L (ref 21–32)
CO2 SERPL-SCNC: 20 MMOL/L (ref 21–32)
CO2 SERPL-SCNC: 21 MMOL/L (ref 21–32)
CO2 SERPL-SCNC: 24 MMOL/L (ref 21–32)
CO2 SERPL-SCNC: 24 MMOL/L (ref 21–32)
CO2 SERPL-SCNC: 25 MMOL/L (ref 21–32)
CO2 SERPL-SCNC: 26 MMOL/L (ref 21–32)
COLOR UR: YELLOW
CORTIS SERPL-MCNC: 27.6 UG/DL
CORTIS SERPL-MCNC: 7 UG/DL
CREAT BLD-MCNC: 1.27 MG/DL
CREAT BLD-MCNC: 1.29 MG/DL
CREAT BLD-MCNC: 1.34 MG/DL
CREAT BLD-MCNC: 1.41 MG/DL
CREAT BLD-MCNC: 1.43 MG/DL
CREAT BLD-MCNC: 1.48 MG/DL
CREAT BLD-MCNC: 1.48 MG/DL
CREAT BLD-MCNC: 1.55 MG/DL
CREAT BLD-MCNC: 1.57 MG/DL
CREAT BLD-MCNC: 1.6 MG/DL
CREAT BLD-MCNC: 1.63 MG/DL
CREAT BLD-MCNC: 1.67 MG/DL
CREAT BLD-MCNC: 1.77 MG/DL
CREAT BLD-MCNC: 1.78 MG/DL
CREAT BLD-MCNC: 1.8 MG/DL
CREAT BLD-MCNC: 1.87 MG/DL
CREAT BLD-MCNC: 1.91 MG/DL
CREAT BLD-MCNC: 1.98 MG/DL
CREAT BLD-MCNC: 2.04 MG/DL
CREAT BLD-MCNC: 2.21 MG/DL
CREAT BLD-MCNC: 2.34 MG/DL
CREAT UR-SCNC: 71.8 MG/DL
DEPRECATED RDW RBC AUTO: 47.9 FL (ref 35.1–46.3)
DEPRECATED RDW RBC AUTO: 47.9 FL (ref 35.1–46.3)
DEPRECATED RDW RBC AUTO: 48.3 FL (ref 35.1–46.3)
DEPRECATED RDW RBC AUTO: 49.5 FL (ref 35.1–46.3)
DEPRECATED RDW RBC AUTO: 49.5 FL (ref 35.1–46.3)
DEPRECATED RDW RBC AUTO: 49.7 FL (ref 35.1–46.3)
DEPRECATED RDW RBC AUTO: 50.5 FL (ref 35.1–46.3)
DEPRECATED RDW RBC AUTO: 51.6 FL (ref 35.1–46.3)
DEPRECATED RDW RBC AUTO: 52.1 FL (ref 35.1–46.3)
DEPRECATED RDW RBC AUTO: 52.9 FL (ref 35.1–46.3)
DEPRECATED RDW RBC AUTO: 53.1 FL (ref 35.1–46.3)
DEPRECATED RDW RBC AUTO: 53.3 FL (ref 35.1–46.3)
DEPRECATED RDW RBC AUTO: 53.3 FL (ref 35.1–46.3)
DEPRECATED RDW RBC AUTO: 53.5 FL (ref 35.1–46.3)
DEPRECATED RDW RBC AUTO: 53.9 FL (ref 35.1–46.3)
DEPRECATED RDW RBC AUTO: 55.2 FL (ref 35.1–46.3)
DEPRECATED RDW RBC AUTO: 55.6 FL (ref 35.1–46.3)
DEPRECATED RDW RBC AUTO: 56.1 FL (ref 35.1–46.3)
EOSINOPHIL # BLD AUTO: 0.28 X10(3) UL (ref 0–0.7)
EOSINOPHIL # BLD: 0 X10(3) UL (ref 0–0.7)
EOSINOPHIL # BLD: 0.09 X10(3) UL (ref 0–0.7)
EOSINOPHIL # BLD: 0.1 X10(3) UL (ref 0–0.7)
EOSINOPHIL # BLD: 0.11 X10(3) UL (ref 0–0.7)
EOSINOPHIL # BLD: 0.12 X10(3) UL (ref 0–0.7)
EOSINOPHIL # BLD: 0.18 X10(3) UL (ref 0–0.7)
EOSINOPHIL # BLD: 0.18 X10(3) UL (ref 0–0.7)
EOSINOPHIL # BLD: 0.2 X10(3) UL (ref 0–0.7)
EOSINOPHIL # BLD: 0.21 X10(3) UL (ref 0–0.7)
EOSINOPHIL # BLD: 0.22 X10(3) UL (ref 0–0.7)
EOSINOPHIL # BLD: 0.34 X10(3) UL (ref 0–0.7)
EOSINOPHIL # BLD: 0.35 X10(3) UL (ref 0–0.7)
EOSINOPHIL # BLD: 0.46 X10(3) UL (ref 0–0.7)
EOSINOPHIL # BLD: 0.51 X10(3) UL (ref 0–0.7)
EOSINOPHIL NFR BLD AUTO: 3.8 %
EOSINOPHIL NFR BLD: 0 %
EOSINOPHIL NFR BLD: 1 %
EOSINOPHIL NFR BLD: 2 %
EOSINOPHIL NFR BLD: 3 %
EOSINOPHIL NFR BLD: 3 %
EOSINOPHIL NFR BLD: 4 %
EOSINOPHIL NFR BLD: 6 %
ERYTHROCYTE [DISTWIDTH] IN BLOOD BY AUTOMATED COUNT: 15.4 % (ref 11–15)
ERYTHROCYTE [DISTWIDTH] IN BLOOD BY AUTOMATED COUNT: 15.5 % (ref 11–15)
ERYTHROCYTE [DISTWIDTH] IN BLOOD BY AUTOMATED COUNT: 15.6 % (ref 11–15)
ERYTHROCYTE [DISTWIDTH] IN BLOOD BY AUTOMATED COUNT: 15.8 % (ref 11–15)
ERYTHROCYTE [DISTWIDTH] IN BLOOD BY AUTOMATED COUNT: 15.9 % (ref 11–15)
ERYTHROCYTE [DISTWIDTH] IN BLOOD BY AUTOMATED COUNT: 16 % (ref 11–15)
ERYTHROCYTE [DISTWIDTH] IN BLOOD BY AUTOMATED COUNT: 16.7 % (ref 11–15)
ERYTHROCYTE [DISTWIDTH] IN BLOOD BY AUTOMATED COUNT: 17.2 % (ref 11–15)
ERYTHROCYTE [DISTWIDTH] IN BLOOD BY AUTOMATED COUNT: 17.3 % (ref 11–15)
ERYTHROCYTE [DISTWIDTH] IN BLOOD BY AUTOMATED COUNT: 17.4 % (ref 11–15)
ERYTHROCYTE [DISTWIDTH] IN BLOOD BY AUTOMATED COUNT: 17.4 % (ref 11–15)
ERYTHROCYTE [DISTWIDTH] IN BLOOD BY AUTOMATED COUNT: 17.5 % (ref 11–15)
ERYTHROCYTE [DISTWIDTH] IN BLOOD BY AUTOMATED COUNT: 17.5 % (ref 11–15)
ERYTHROCYTE [DISTWIDTH] IN BLOOD BY AUTOMATED COUNT: 17.6 % (ref 11–15)
ERYTHROCYTE [DISTWIDTH] IN BLOOD BY AUTOMATED COUNT: 17.6 % (ref 11–15)
ERYTHROCYTE [DISTWIDTH] IN BLOOD BY AUTOMATED COUNT: 17.7 % (ref 11–15)
ERYTHROCYTE [DISTWIDTH] IN BLOOD BY AUTOMATED COUNT: 17.7 % (ref 11–15)
ERYTHROCYTE [DISTWIDTH] IN BLOOD BY AUTOMATED COUNT: 18.1 % (ref 11–15)
EST. AVERAGE GLUCOSE BLD GHB EST-MCNC: 114 MG/DL (ref 68–126)
GLOBULIN PLAS-MCNC: 2.2 G/DL (ref 2.8–4.4)
GLOBULIN PLAS-MCNC: 2.3 G/DL (ref 2.8–4.4)
GLOBULIN PLAS-MCNC: 2.4 G/DL (ref 2.8–4.4)
GLOBULIN PLAS-MCNC: 2.9 G/DL (ref 2.8–4.4)
GLUCOSE BLD-MCNC: 100 MG/DL (ref 70–99)
GLUCOSE BLD-MCNC: 102 MG/DL (ref 70–99)
GLUCOSE BLD-MCNC: 103 MG/DL (ref 70–99)
GLUCOSE BLD-MCNC: 104 MG/DL (ref 70–99)
GLUCOSE BLD-MCNC: 106 MG/DL (ref 70–99)
GLUCOSE BLD-MCNC: 106 MG/DL (ref 70–99)
GLUCOSE BLD-MCNC: 108 MG/DL (ref 70–99)
GLUCOSE BLD-MCNC: 111 MG/DL (ref 70–99)
GLUCOSE BLD-MCNC: 121 MG/DL (ref 70–99)
GLUCOSE BLD-MCNC: 127 MG/DL (ref 70–99)
GLUCOSE BLD-MCNC: 131 MG/DL (ref 70–99)
GLUCOSE BLD-MCNC: 141 MG/DL (ref 70–99)
GLUCOSE BLD-MCNC: 159 MG/DL (ref 70–99)
GLUCOSE BLD-MCNC: 63 MG/DL (ref 70–99)
GLUCOSE BLD-MCNC: 85 MG/DL (ref 70–99)
GLUCOSE BLD-MCNC: 86 MG/DL (ref 70–99)
GLUCOSE BLD-MCNC: 87 MG/DL (ref 70–99)
GLUCOSE BLD-MCNC: 97 MG/DL (ref 70–99)
GLUCOSE BLD-MCNC: 98 MG/DL (ref 70–99)
GLUCOSE BLD-MCNC: 98 MG/DL (ref 70–99)
GLUCOSE BLD-MCNC: 99 MG/DL (ref 70–99)
GLUCOSE BLDC GLUCOMTR-MCNC: 126 MG/DL (ref 70–99)
GLUCOSE BLDC GLUCOMTR-MCNC: 86 MG/DL (ref 70–99)
GLUCOSE UR-MCNC: NEGATIVE MG/DL
HAV IGM SER QL: 1.5 MG/DL (ref 1.6–2.6)
HAV IGM SER QL: 1.6 MG/DL (ref 1.6–2.6)
HAV IGM SER QL: 1.7 MG/DL (ref 1.6–2.6)
HAV IGM SER QL: 1.7 MG/DL (ref 1.6–2.6)
HAV IGM SER QL: 1.8 MG/DL (ref 1.6–2.6)
HAV IGM SER QL: 1.8 MG/DL (ref 1.6–2.6)
HAV IGM SER QL: 1.9 MG/DL (ref 1.6–2.6)
HAV IGM SER QL: 2 MG/DL (ref 1.6–2.6)
HAV IGM SER QL: 2.1 MG/DL (ref 1.6–2.6)
HAV IGM SER QL: 2.2 MG/DL (ref 1.6–2.6)
HAV IGM SER QL: 2.6 MG/DL (ref 1.6–2.6)
HBA1C MFR BLD HPLC: 5.6 % (ref ?–5.7)
HCT VFR BLD AUTO: 18.8 %
HCT VFR BLD AUTO: 19.9 %
HCT VFR BLD AUTO: 20.7 %
HCT VFR BLD AUTO: 21.5 %
HCT VFR BLD AUTO: 22.1 %
HCT VFR BLD AUTO: 22.7 %
HCT VFR BLD AUTO: 23.9 %
HCT VFR BLD AUTO: 24.5 %
HCT VFR BLD AUTO: 24.5 %
HCT VFR BLD AUTO: 24.8 %
HCT VFR BLD AUTO: 24.8 %
HCT VFR BLD AUTO: 25.3 %
HCT VFR BLD AUTO: 25.9 %
HCT VFR BLD AUTO: 25.9 %
HCT VFR BLD AUTO: 26.2 %
HCT VFR BLD AUTO: 26.3 %
HCT VFR BLD AUTO: 26.6 %
HCT VFR BLD AUTO: 27.9 %
HCT VFR BLD AUTO: 28.1 %
HCT VFR BLD AUTO: 28.9 %
HCT VFR BLD AUTO: 29 %
HDLC SERPL-MCNC: 34 MG/DL (ref 40–59)
HEMOCCULT STL QL: POSITIVE
HGB BLD-MCNC: 6 G/DL
HGB BLD-MCNC: 6.5 G/DL
HGB BLD-MCNC: 7 G/DL
HGB BLD-MCNC: 7.1 G/DL
HGB BLD-MCNC: 7.4 G/DL
HGB BLD-MCNC: 7.4 G/DL
HGB BLD-MCNC: 7.8 G/DL
HGB BLD-MCNC: 7.9 G/DL
HGB BLD-MCNC: 8 G/DL
HGB BLD-MCNC: 8 G/DL
HGB BLD-MCNC: 8.1 G/DL
HGB BLD-MCNC: 8.1 G/DL
HGB BLD-MCNC: 8.5 G/DL
HGB BLD-MCNC: 8.6 G/DL
HGB BLD-MCNC: 8.6 G/DL
HGB BLD-MCNC: 8.8 G/DL
HGB BLD-MCNC: 8.9 G/DL
HGB BLD-MCNC: 8.9 G/DL
HGB BLD-MCNC: 9.1 G/DL
HGB BLD-MCNC: 9.1 G/DL
HGB BLD-MCNC: 9.3 G/DL
HGB BLD-MCNC: 9.4 G/DL
HGB UR QL STRIP.AUTO: NEGATIVE
HGB UR QL STRIP.AUTO: NEGATIVE
HYALINE CASTS #/AREA URNS AUTO: PRESENT /LPF
IMM GRANULOCYTES # BLD AUTO: 0.31 X10(3) UL (ref 0–1)
IMM GRANULOCYTES NFR BLD: 4.2 %
INR BLD: 1.57 (ref 0.9–1.2)
IRON SATURATION: 15 %
IRON SERPL-MCNC: 20 UG/DL
KETONES UR-MCNC: NEGATIVE MG/DL
LACTATE SERPL-SCNC: 0.8 MMOL/L (ref 0.4–2)
LDLC SERPL CALC-MCNC: 79 MG/DL (ref ?–100)
LEUKOCYTE ESTERASE UR QL STRIP.AUTO: NEGATIVE
LYMPHOCYTES # BLD AUTO: 0.42 X10(3) UL (ref 1–4)
LYMPHOCYTES NFR BLD AUTO: 5.7 %
LYMPHOCYTES NFR BLD: 0.05 X10(3) UL (ref 1–4)
LYMPHOCYTES NFR BLD: 0.23 X10(3) UL (ref 1–4)
LYMPHOCYTES NFR BLD: 0.32 X10(3) UL (ref 1–4)
LYMPHOCYTES NFR BLD: 0.33 X10(3) UL (ref 1–4)
LYMPHOCYTES NFR BLD: 0.35 X10(3) UL (ref 1–4)
LYMPHOCYTES NFR BLD: 0.36 X10(3) UL (ref 1–4)
LYMPHOCYTES NFR BLD: 0.42 X10(3) UL (ref 1–4)
LYMPHOCYTES NFR BLD: 0.43 X10(3) UL (ref 1–4)
LYMPHOCYTES NFR BLD: 0.44 X10(3) UL (ref 1–4)
LYMPHOCYTES NFR BLD: 0.45 X10(3) UL (ref 1–4)
LYMPHOCYTES NFR BLD: 0.47 X10(3) UL (ref 1–4)
LYMPHOCYTES NFR BLD: 0.52 X10(3) UL (ref 1–4)
LYMPHOCYTES NFR BLD: 0.54 X10(3) UL (ref 1–4)
LYMPHOCYTES NFR BLD: 0.7 X10(3) UL (ref 1–4)
LYMPHOCYTES NFR BLD: 0.71 X10(3) UL (ref 1–4)
LYMPHOCYTES NFR BLD: 0.88 X10(3) UL (ref 1–4)
LYMPHOCYTES NFR BLD: 0.97 X10(3) UL (ref 1–4)
LYMPHOCYTES NFR BLD: 0.99 X10(3) UL (ref 1–4)
LYMPHOCYTES NFR BLD: 1 %
LYMPHOCYTES NFR BLD: 10 %
LYMPHOCYTES NFR BLD: 11 %
LYMPHOCYTES NFR BLD: 2 %
LYMPHOCYTES NFR BLD: 3 %
LYMPHOCYTES NFR BLD: 4 %
LYMPHOCYTES NFR BLD: 5 %
LYMPHOCYTES NFR BLD: 6 %
LYMPHOCYTES NFR BLD: 6 %
LYMPHOCYTES NFR BLD: 7 %
LYMPHOCYTES NFR BLD: 8 %
M PROTEIN MFR SERPL ELPH: 4.2 G/DL (ref 6.4–8.2)
M PROTEIN MFR SERPL ELPH: 4.7 G/DL (ref 6.4–8.2)
M PROTEIN MFR SERPL ELPH: 5.5 G/DL (ref 6.4–8.2)
M PROTEIN MFR SERPL ELPH: 5.8 G/DL (ref 6.4–8.2)
MCH RBC QN AUTO: 27.3 PG (ref 26–34)
MCH RBC QN AUTO: 27.5 PG (ref 26–34)
MCH RBC QN AUTO: 27.6 PG (ref 26–34)
MCH RBC QN AUTO: 27.6 PG (ref 26–34)
MCH RBC QN AUTO: 27.7 PG (ref 26–34)
MCH RBC QN AUTO: 27.8 PG (ref 26–34)
MCH RBC QN AUTO: 27.9 PG (ref 26–34)
MCH RBC QN AUTO: 28 PG (ref 26–34)
MCH RBC QN AUTO: 28.2 PG (ref 26–34)
MCH RBC QN AUTO: 28.2 PG (ref 26–34)
MCH RBC QN AUTO: 28.3 PG (ref 26–34)
MCH RBC QN AUTO: 28.3 PG (ref 26–34)
MCH RBC QN AUTO: 28.5 PG (ref 26–34)
MCH RBC QN AUTO: 28.5 PG (ref 26–34)
MCH RBC QN AUTO: 28.6 PG (ref 26–34)
MCH RBC QN AUTO: 28.7 PG (ref 26–34)
MCH RBC QN AUTO: 29.2 PG (ref 26–34)
MCHC RBC AUTO-ENTMCNC: 31.6 G/DL (ref 31–37)
MCHC RBC AUTO-ENTMCNC: 31.9 G/DL (ref 31–37)
MCHC RBC AUTO-ENTMCNC: 31.9 G/DL (ref 31–37)
MCHC RBC AUTO-ENTMCNC: 32.1 G/DL (ref 31–37)
MCHC RBC AUTO-ENTMCNC: 32.3 G/DL (ref 31–37)
MCHC RBC AUTO-ENTMCNC: 32.4 G/DL (ref 31–37)
MCHC RBC AUTO-ENTMCNC: 32.6 G/DL (ref 31–37)
MCHC RBC AUTO-ENTMCNC: 32.7 G/DL (ref 31–37)
MCHC RBC AUTO-ENTMCNC: 32.7 G/DL (ref 31–37)
MCHC RBC AUTO-ENTMCNC: 32.8 G/DL (ref 31–37)
MCHC RBC AUTO-ENTMCNC: 33.1 G/DL (ref 31–37)
MCHC RBC AUTO-ENTMCNC: 33.2 G/DL (ref 31–37)
MCHC RBC AUTO-ENTMCNC: 33.5 G/DL (ref 31–37)
MCHC RBC AUTO-ENTMCNC: 34 G/DL (ref 31–37)
MCHC RBC AUTO-ENTMCNC: 34.3 G/DL (ref 31–37)
MCV RBC AUTO: 84.5 FL
MCV RBC AUTO: 84.7 FL
MCV RBC AUTO: 84.8 FL
MCV RBC AUTO: 85 FL
MCV RBC AUTO: 85.2 FL
MCV RBC AUTO: 85.2 FL
MCV RBC AUTO: 85.4 FL
MCV RBC AUTO: 85.4 FL
MCV RBC AUTO: 85.5 FL
MCV RBC AUTO: 86 FL
MCV RBC AUTO: 86 FL
MCV RBC AUTO: 86.2 FL
MCV RBC AUTO: 86.3 FL
MCV RBC AUTO: 86.5 FL
MCV RBC AUTO: 87.1 FL
MCV RBC AUTO: 87.3 FL
MCV RBC AUTO: 87.4 FL
MCV RBC AUTO: 87.9 FL
METAMYELOCYTES # BLD: 0.09 X10(3) UL
METAMYELOCYTES # BLD: 0.09 X10(3) UL
METAMYELOCYTES # BLD: 0.11 X10(3) UL
METAMYELOCYTES # BLD: 0.11 X10(3) UL
METAMYELOCYTES # BLD: 0.18 X10(3) UL
METAMYELOCYTES # BLD: 0.21 X10(3) UL
METAMYELOCYTES # BLD: 0.24 X10(3) UL
METAMYELOCYTES # BLD: 0.24 X10(3) UL
METAMYELOCYTES # BLD: 0.35 X10(3) UL
METAMYELOCYTES # BLD: 0.44 X10(3) UL
METAMYELOCYTES # BLD: 0.48 X10(3) UL
METAMYELOCYTES NFR BLD: 1 %
METAMYELOCYTES NFR BLD: 2 %
METAMYELOCYTES NFR BLD: 3 %
METAMYELOCYTES NFR BLD: 4 %
METAMYELOCYTES NFR BLD: 4 %
MONOCYTES # BLD AUTO: 0.88 X10(3) UL (ref 0.1–1)
MONOCYTES # BLD: 0.26 X10(3) UL (ref 0.1–1)
MONOCYTES # BLD: 0.3 X10(3) UL (ref 0.1–1)
MONOCYTES # BLD: 0.34 X10(3) UL (ref 0.1–1)
MONOCYTES # BLD: 0.36 X10(3) UL (ref 0.1–1)
MONOCYTES # BLD: 0.36 X10(3) UL (ref 0.1–1)
MONOCYTES # BLD: 0.45 X10(3) UL (ref 0.1–1)
MONOCYTES # BLD: 0.55 X10(3) UL (ref 0.1–1)
MONOCYTES # BLD: 0.6 X10(3) UL (ref 0.1–1)
MONOCYTES # BLD: 0.62 X10(3) UL (ref 0.1–1)
MONOCYTES # BLD: 0.62 X10(3) UL (ref 0.1–1)
MONOCYTES # BLD: 0.69 X10(3) UL (ref 0.1–1)
MONOCYTES # BLD: 0.71 X10(3) UL (ref 0.1–1)
MONOCYTES # BLD: 0.74 X10(3) UL (ref 0.1–1)
MONOCYTES # BLD: 0.81 X10(3) UL (ref 0.1–1)
MONOCYTES # BLD: 0.84 X10(3) UL (ref 0.1–1)
MONOCYTES # BLD: 0.89 X10(3) UL (ref 0.1–1)
MONOCYTES # BLD: 0.9 X10(3) UL (ref 0.1–1)
MONOCYTES # BLD: 1.04 X10(3) UL (ref 0.1–1)
MONOCYTES NFR BLD AUTO: 11.9 %
MONOCYTES NFR BLD: 10 %
MONOCYTES NFR BLD: 3 %
MONOCYTES NFR BLD: 3 %
MONOCYTES NFR BLD: 4 %
MONOCYTES NFR BLD: 4 %
MONOCYTES NFR BLD: 5 %
MONOCYTES NFR BLD: 6 %
MONOCYTES NFR BLD: 7 %
MONOCYTES NFR BLD: 8 %
MONOCYTES NFR BLD: 8 %
MONOCYTES NFR BLD: 9 %
MORPHOLOGY: NORMAL
MYELOCYTES # BLD: 0.1 X10(3) UL
MYELOCYTES # BLD: 0.12 X10(3) UL
MYELOCYTES # BLD: 0.12 X10(3) UL
MYELOCYTES # BLD: 0.21 X10(3) UL
MYELOCYTES # BLD: 0.22 X10(3) UL
MYELOCYTES # BLD: 0.36 X10(3) UL
MYELOCYTES # BLD: 0.36 X10(3) UL
MYELOCYTES # BLD: 0.48 X10(3) UL
MYELOCYTES NFR BLD: 1 %
MYELOCYTES NFR BLD: 2 %
MYELOCYTES NFR BLD: 2 %
MYELOCYTES NFR BLD: 3 %
MYELOCYTES NFR BLD: 4 %
MYELOCYTES NFR BLD: 4 %
NEUTROPHILS # BLD AUTO: 3.71 X10 (3) UL (ref 1.5–7.7)
NEUTROPHILS # BLD AUTO: 5.41 X10 (3) UL (ref 1.5–7.7)
NEUTROPHILS # BLD AUTO: 5.48 X10 (3) UL (ref 1.5–7.7)
NEUTROPHILS # BLD AUTO: 5.48 X10(3) UL (ref 1.5–7.7)
NEUTROPHILS # BLD AUTO: 5.94 X10 (3) UL (ref 1.5–7.7)
NEUTROPHILS # BLD AUTO: 6.1 X10 (3) UL (ref 1.5–7.7)
NEUTROPHILS # BLD AUTO: 6.32 X10 (3) UL (ref 1.5–7.7)
NEUTROPHILS # BLD AUTO: 6.63 X10 (3) UL (ref 1.5–7.7)
NEUTROPHILS # BLD AUTO: 7.02 X10 (3) UL (ref 1.5–7.7)
NEUTROPHILS # BLD AUTO: 7.06 X10 (3) UL (ref 1.5–7.7)
NEUTROPHILS # BLD AUTO: 7.27 X10 (3) UL (ref 1.5–7.7)
NEUTROPHILS # BLD AUTO: 7.5 X10 (3) UL (ref 1.5–7.7)
NEUTROPHILS # BLD AUTO: 7.73 X10 (3) UL (ref 1.5–7.7)
NEUTROPHILS # BLD AUTO: 8.01 X10 (3) UL (ref 1.5–7.7)
NEUTROPHILS # BLD AUTO: 8.17 X10 (3) UL (ref 1.5–7.7)
NEUTROPHILS # BLD AUTO: 8.33 X10 (3) UL (ref 1.5–7.7)
NEUTROPHILS # BLD AUTO: 8.64 X10 (3) UL (ref 1.5–7.7)
NEUTROPHILS # BLD AUTO: 8.66 X10 (3) UL (ref 1.5–7.7)
NEUTROPHILS # BLD AUTO: 8.91 X10 (3) UL (ref 1.5–7.7)
NEUTROPHILS # BLD AUTO: 9.23 X10 (3) UL (ref 1.5–7.7)
NEUTROPHILS NFR BLD AUTO: 74.1 %
NEUTROPHILS NFR BLD: 71 %
NEUTROPHILS NFR BLD: 73 %
NEUTROPHILS NFR BLD: 74 %
NEUTROPHILS NFR BLD: 74 %
NEUTROPHILS NFR BLD: 75 %
NEUTROPHILS NFR BLD: 76 %
NEUTROPHILS NFR BLD: 78 %
NEUTROPHILS NFR BLD: 78 %
NEUTROPHILS NFR BLD: 79 %
NEUTROPHILS NFR BLD: 81 %
NEUTROPHILS NFR BLD: 82 %
NEUTROPHILS NFR BLD: 84 %
NEUTROPHILS NFR BLD: 85 %
NEUTS BAND NFR BLD: 10 %
NEUTS BAND NFR BLD: 11 %
NEUTS BAND NFR BLD: 11 %
NEUTS BAND NFR BLD: 12 %
NEUTS BAND NFR BLD: 2 %
NEUTS BAND NFR BLD: 2 %
NEUTS BAND NFR BLD: 3 %
NEUTS BAND NFR BLD: 4 %
NEUTS BAND NFR BLD: 5 %
NEUTS BAND NFR BLD: 7 %
NEUTS BAND NFR BLD: 7 %
NEUTS BAND NFR BLD: 9 %
NEUTS HYPERSEG # BLD: 10.27 X10(3) UL (ref 1.5–7.7)
NEUTS HYPERSEG # BLD: 10.44 X10(3) UL (ref 1.5–7.7)
NEUTS HYPERSEG # BLD: 4.89 X10(3) UL (ref 1.5–7.7)
NEUTS HYPERSEG # BLD: 7.04 X10(3) UL (ref 1.5–7.7)
NEUTS HYPERSEG # BLD: 7.23 X10(3) UL (ref 1.5–7.7)
NEUTS HYPERSEG # BLD: 7.29 X10(3) UL (ref 1.5–7.7)
NEUTS HYPERSEG # BLD: 7.56 X10(3) UL (ref 1.5–7.7)
NEUTS HYPERSEG # BLD: 7.91 X10(3) UL (ref 1.5–7.7)
NEUTS HYPERSEG # BLD: 8.53 X10(3) UL (ref 1.5–7.7)
NEUTS HYPERSEG # BLD: 8.84 X10(3) UL (ref 1.5–7.7)
NEUTS HYPERSEG # BLD: 8.89 X10(3) UL (ref 1.5–7.7)
NEUTS HYPERSEG # BLD: 9.1 X10(3) UL (ref 1.5–7.7)
NEUTS HYPERSEG # BLD: 9.43 X10(3) UL (ref 1.5–7.7)
NEUTS HYPERSEG # BLD: 9.52 X10(3) UL (ref 1.5–7.7)
NEUTS HYPERSEG # BLD: 9.63 X10(3) UL (ref 1.5–7.7)
NEUTS HYPERSEG # BLD: 9.66 X10(3) UL (ref 1.5–7.7)
NEUTS HYPERSEG # BLD: 9.68 X10(3) UL (ref 1.5–7.7)
NEUTS HYPERSEG # BLD: 9.8 X10(3) UL (ref 1.5–7.7)
NITRITE UR QL STRIP.AUTO: NEGATIVE
NONHDLC SERPL-MCNC: 92 MG/DL (ref ?–130)
NRBC BLD MANUAL-RTO: 1 %
NRBC BLD MANUAL-RTO: 1 %
NT-PROBNP SERPL-MCNC: 1909 PG/ML (ref ?–450)
NT-PROBNP SERPL-MCNC: 8159 PG/ML (ref ?–450)
NT-PROBNP SERPL-MCNC: ABNORMAL PG/ML (ref ?–450)
OSMOLALITY SERPL CALC.SUM OF ELEC: 275 MOSM/KG (ref 275–295)
OSMOLALITY SERPL CALC.SUM OF ELEC: 280 MOSM/KG (ref 275–295)
OSMOLALITY SERPL CALC.SUM OF ELEC: 284 MOSM/KG (ref 275–295)
OSMOLALITY SERPL CALC.SUM OF ELEC: 286 MOSM/KG (ref 275–295)
OSMOLALITY SERPL CALC.SUM OF ELEC: 287 MOSM/KG (ref 275–295)
OSMOLALITY SERPL CALC.SUM OF ELEC: 287 MOSM/KG (ref 275–295)
OSMOLALITY SERPL CALC.SUM OF ELEC: 289 MOSM/KG (ref 275–295)
OSMOLALITY SERPL CALC.SUM OF ELEC: 290 MOSM/KG (ref 275–295)
OSMOLALITY SERPL CALC.SUM OF ELEC: 293 MOSM/KG (ref 275–295)
OSMOLALITY SERPL CALC.SUM OF ELEC: 294 MOSM/KG (ref 275–295)
OSMOLALITY SERPL CALC.SUM OF ELEC: 294 MOSM/KG (ref 275–295)
OSMOLALITY SERPL CALC.SUM OF ELEC: 295 MOSM/KG (ref 275–295)
OSMOLALITY SERPL CALC.SUM OF ELEC: 295 MOSM/KG (ref 275–295)
OSMOLALITY SERPL CALC.SUM OF ELEC: 296 MOSM/KG (ref 275–295)
OSMOLALITY SERPL CALC.SUM OF ELEC: 297 MOSM/KG (ref 275–295)
OSMOLALITY SERPL CALC.SUM OF ELEC: 297 MOSM/KG (ref 275–295)
OSMOLALITY SERPL CALC.SUM OF ELEC: 298 MOSM/KG (ref 275–295)
OSMOLALITY SERPL CALC.SUM OF ELEC: 299 MOSM/KG (ref 275–295)
OSMOLALITY SERPL CALC.SUM OF ELEC: 304 MOSM/KG (ref 275–295)
OSMOLALITY SERPL CALC.SUM OF ELEC: 306 MOSM/KG (ref 275–295)
OSMOLALITY SERPL CALC.SUM OF ELEC: 307 MOSM/KG (ref 275–295)
OSMOLALITY UR: 381 MOSM/KG (ref 300–1100)
PH UR: 5 [PH] (ref 5–8)
PH UR: 5.5 [PH] (ref 5–8)
PH UR: 6 [PH] (ref 5–8)
PHOSPHATE SERPL-MCNC: 2.9 MG/DL (ref 2.5–4.9)
PHOSPHATE SERPL-MCNC: 3 MG/DL (ref 2.5–4.9)
PHOSPHATE SERPL-MCNC: 3.1 MG/DL (ref 2.5–4.9)
PHOSPHATE SERPL-MCNC: 3.2 MG/DL (ref 2.5–4.9)
PHOSPHATE SERPL-MCNC: 3.3 MG/DL (ref 2.5–4.9)
PHOSPHATE SERPL-MCNC: 3.4 MG/DL (ref 2.5–4.9)
PHOSPHATE SERPL-MCNC: 3.6 MG/DL (ref 2.5–4.9)
PHOSPHATE SERPL-MCNC: 3.7 MG/DL (ref 2.5–4.9)
PHOSPHATE SERPL-MCNC: 3.7 MG/DL (ref 2.5–4.9)
PHOSPHATE SERPL-MCNC: 3.9 MG/DL (ref 2.5–4.9)
PHOSPHATE SERPL-MCNC: 4.2 MG/DL (ref 2.5–4.9)
PHOSPHATE SERPL-MCNC: 4.2 MG/DL (ref 2.5–4.9)
PHOSPHATE SERPL-MCNC: 4.9 MG/DL (ref 2.5–4.9)
PHOSPHATE SERPL-MCNC: 4.9 MG/DL (ref 2.5–4.9)
PHOSPHATE SERPL-MCNC: 5.1 MG/DL (ref 2.5–4.9)
PHOSPHATE SERPL-MCNC: 5.4 MG/DL (ref 2.5–4.9)
PLATELET # BLD AUTO: 172 10(3)UL (ref 150–450)
PLATELET # BLD AUTO: 193 10(3)UL (ref 150–450)
PLATELET # BLD AUTO: 194 10(3)UL (ref 150–450)
PLATELET # BLD AUTO: 194 10(3)UL (ref 150–450)
PLATELET # BLD AUTO: 196 10(3)UL (ref 150–450)
PLATELET # BLD AUTO: 196 10(3)UL (ref 150–450)
PLATELET # BLD AUTO: 207 10(3)UL (ref 150–450)
PLATELET # BLD AUTO: 211 10(3)UL (ref 150–450)
PLATELET # BLD AUTO: 219 10(3)UL (ref 150–450)
PLATELET # BLD AUTO: 220 10(3)UL (ref 150–450)
PLATELET # BLD AUTO: 222 10(3)UL (ref 150–450)
PLATELET # BLD AUTO: 224 10(3)UL (ref 150–450)
PLATELET # BLD AUTO: 232 10(3)UL (ref 150–450)
PLATELET # BLD AUTO: 234 10(3)UL (ref 150–450)
PLATELET # BLD AUTO: 235 10(3)UL (ref 150–450)
PLATELET # BLD AUTO: 239 10(3)UL (ref 150–450)
PLATELET # BLD AUTO: 245 10(3)UL (ref 150–450)
PLATELET # BLD AUTO: 301 10(3)UL (ref 150–450)
PLATELET # BLD AUTO: 307 10(3)UL (ref 150–450)
PLATELET # BLD AUTO: 313 10(3)UL (ref 150–450)
PLATELET MORPHOLOGY: NORMAL
POTASSIUM SERPL-SCNC: 2.8 MMOL/L (ref 3.5–5.1)
POTASSIUM SERPL-SCNC: 3 MMOL/L (ref 3.5–5.1)
POTASSIUM SERPL-SCNC: 3.5 MMOL/L (ref 3.5–5.1)
POTASSIUM SERPL-SCNC: 3.5 MMOL/L (ref 3.5–5.1)
POTASSIUM SERPL-SCNC: 3.7 MMOL/L (ref 3.5–5.1)
POTASSIUM SERPL-SCNC: 4.1 MMOL/L (ref 3.5–5.1)
POTASSIUM SERPL-SCNC: 4.1 MMOL/L (ref 3.5–5.1)
POTASSIUM SERPL-SCNC: 4.3 MMOL/L (ref 3.5–5.1)
POTASSIUM SERPL-SCNC: 4.4 MMOL/L (ref 3.5–5.1)
POTASSIUM SERPL-SCNC: 4.5 MMOL/L (ref 3.5–5.1)
POTASSIUM SERPL-SCNC: 4.5 MMOL/L (ref 3.5–5.1)
POTASSIUM SERPL-SCNC: 4.6 MMOL/L (ref 3.5–5.1)
POTASSIUM SERPL-SCNC: 4.7 MMOL/L (ref 3.5–5.1)
POTASSIUM SERPL-SCNC: 4.8 MMOL/L (ref 3.5–5.1)
POTASSIUM SERPL-SCNC: 4.8 MMOL/L (ref 3.5–5.1)
POTASSIUM SERPL-SCNC: 5.2 MMOL/L (ref 3.5–5.1)
POTASSIUM SERPL-SCNC: 5.3 MMOL/L (ref 3.5–5.1)
POTASSIUM SERPL-SCNC: 5.7 MMOL/L (ref 3.5–5.1)
PROCALCITONIN SERPL-MCNC: 0.24 NG/ML (ref ?–0.16)
PROT UR-MCNC: 30 MG/DL
PROT UR-MCNC: NEGATIVE MG/DL
PROT UR-MCNC: NEGATIVE MG/DL
PROTHROMBIN TIME: 18.5 SECONDS (ref 11.8–14.5)
PTH-INTACT SERPL-MCNC: 8.2 PG/ML (ref 18.5–88)
RBC # BLD AUTO: 2.14 X10(6)UL
RBC # BLD AUTO: 2.3 X10(6)UL
RBC # BLD AUTO: 2.43 X10(6)UL
RBC # BLD AUTO: 2.46 X10(6)UL
RBC # BLD AUTO: 2.6 X10(6)UL
RBC # BLD AUTO: 2.68 X10(6)UL
RBC # BLD AUTO: 2.83 X10(6)UL
RBC # BLD AUTO: 2.84 X10(6)UL
RBC # BLD AUTO: 2.87 X10(6)UL
RBC # BLD AUTO: 2.9 X10(6)UL
RBC # BLD AUTO: 2.91 X10(6)UL
RBC # BLD AUTO: 2.93 X10(6)UL
RBC # BLD AUTO: 3.01 X10(6)UL
RBC # BLD AUTO: 3.01 X10(6)UL
RBC # BLD AUTO: 3.08 X10(6)UL
RBC # BLD AUTO: 3.1 X10(6)UL
RBC # BLD AUTO: 3.11 X10(6)UL
RBC # BLD AUTO: 3.26 X10(6)UL
RBC # BLD AUTO: 3.29 X10(6)UL
RBC # BLD AUTO: 3.33 X10(6)UL
RH BLOOD TYPE: POSITIVE
RH BLOOD TYPE: POSITIVE
SARS-COV-2 RNA RESP QL NAA+PROBE: NOT DETECTED
SODIUM SERPL-SCNC: 126 MMOL/L (ref 136–145)
SODIUM SERPL-SCNC: 129 MMOL/L (ref 136–145)
SODIUM SERPL-SCNC: 131 MMOL/L (ref 136–145)
SODIUM SERPL-SCNC: 132 MMOL/L (ref 136–145)
SODIUM SERPL-SCNC: 132 MMOL/L (ref 136–145)
SODIUM SERPL-SCNC: 133 MMOL/L (ref 136–145)
SODIUM SERPL-SCNC: 134 MMOL/L (ref 136–145)
SODIUM SERPL-SCNC: 135 MMOL/L (ref 136–145)
SODIUM SERPL-SCNC: 136 MMOL/L (ref 136–145)
SODIUM SERPL-SCNC: 136 MMOL/L (ref 136–145)
SODIUM SERPL-SCNC: 138 MMOL/L (ref 136–145)
SODIUM SERPL-SCNC: 17 MMOL/L
SODIUM SERPL-SCNC: 87 MMOL/L
SP GR UR STRIP: 1.01 (ref 1–1.03)
SP GR UR STRIP: 1.01 (ref 1–1.03)
SP GR UR STRIP: 1.02 (ref 1–1.03)
TOTAL CELLS COUNTED: 100
TOTAL IRON BINDING CAPACITY: 137 UG/DL (ref 240–450)
TRANSFERRIN SERPL-MCNC: 92 MG/DL (ref 200–360)
TRIGL SERPL-MCNC: 58 MG/DL (ref 30–149)
TROPONIN I SERPL-MCNC: <0.045 NG/ML (ref ?–0.04)
TSI SER-ACNC: 0.73 MIU/ML (ref 0.36–3.74)
UROBILINOGEN UR STRIP-ACNC: <2
VIT D+METAB SERPL-MCNC: 67.2 NG/ML (ref 30–100)
VLDLC SERPL CALC-MCNC: 9 MG/DL (ref 0–30)
WBC # BLD AUTO: 10.1 X10(3) UL (ref 4–11)
WBC # BLD AUTO: 10.4 X10(3) UL (ref 4–11)
WBC # BLD AUTO: 10.4 X10(3) UL (ref 4–11)
WBC # BLD AUTO: 10.6 X10(3) UL (ref 4–11)
WBC # BLD AUTO: 11 X10(3) UL (ref 4–11)
WBC # BLD AUTO: 11.1 X10(3) UL (ref 4–11)
WBC # BLD AUTO: 11.2 X10(3) UL (ref 4–11)
WBC # BLD AUTO: 11.5 X10(3) UL (ref 4–11)
WBC # BLD AUTO: 11.6 X10(3) UL (ref 4–11)
WBC # BLD AUTO: 11.8 X10(3) UL (ref 4–11)
WBC # BLD AUTO: 12 X10(3) UL (ref 4–11)
WBC # BLD AUTO: 12.1 X10(3) UL (ref 4–11)
WBC # BLD AUTO: 12.1 X10(3) UL (ref 4–11)
WBC # BLD AUTO: 5.2 X10(3) UL (ref 4–11)
WBC # BLD AUTO: 7.4 X10(3) UL (ref 4–11)
WBC # BLD AUTO: 8.5 X10(3) UL (ref 4–11)
WBC # BLD AUTO: 8.8 X10(3) UL (ref 4–11)
WBC # BLD AUTO: 9 X10(3) UL (ref 4–11)
WBC # BLD AUTO: 9 X10(3) UL (ref 4–11)
WBC # BLD AUTO: 9.3 X10(3) UL (ref 4–11)
WBC #/AREA URNS AUTO: >50 /HPF
WBC CLUMPS UR QL AUTO: PRESENT /HPF

## 2021-01-01 PROCEDURE — 70551 MRI BRAIN STEM W/O DYE: CPT | Performed by: EMERGENCY MEDICINE

## 2021-01-01 PROCEDURE — 81001 URINALYSIS AUTO W/SCOPE: CPT

## 2021-01-01 PROCEDURE — 99233 SBSQ HOSP IP/OBS HIGH 50: CPT | Performed by: HOSPITALIST

## 2021-01-01 PROCEDURE — 3074F SYST BP LT 130 MM HG: CPT | Performed by: INTERNAL MEDICINE

## 2021-01-01 PROCEDURE — 99232 SBSQ HOSP IP/OBS MODERATE 35: CPT | Performed by: HOSPITALIST

## 2021-01-01 PROCEDURE — 96374 THER/PROPH/DIAG INJ IV PUSH: CPT

## 2021-01-01 PROCEDURE — 99232 SBSQ HOSP IP/OBS MODERATE 35: CPT | Performed by: INTERNAL MEDICINE

## 2021-01-01 PROCEDURE — 99233 SBSQ HOSP IP/OBS HIGH 50: CPT | Performed by: INTERNAL MEDICINE

## 2021-01-01 PROCEDURE — 99308 SBSQ NF CARE LOW MDM 20: CPT | Performed by: INTERNAL MEDICINE

## 2021-01-01 PROCEDURE — 30233R1 TRANSFUSION OF NONAUTOLOGOUS PLATELETS INTO PERIPHERAL VEIN, PERCUTANEOUS APPROACH: ICD-10-PCS | Performed by: HOSPITALIST

## 2021-01-01 PROCEDURE — 87088 URINE BACTERIA CULTURE: CPT

## 2021-01-01 PROCEDURE — 99223 1ST HOSP IP/OBS HIGH 75: CPT | Performed by: NURSE PRACTITIONER

## 2021-01-01 PROCEDURE — 99233 SBSQ HOSP IP/OBS HIGH 50: CPT | Performed by: NURSE PRACTITIONER

## 2021-01-01 PROCEDURE — 71045 X-RAY EXAM CHEST 1 VIEW: CPT | Performed by: HOSPITALIST

## 2021-01-01 PROCEDURE — 99238 HOSP IP/OBS DSCHRG MGMT 30/<: CPT | Performed by: HOSPITALIST

## 2021-01-01 PROCEDURE — 93010 ELECTROCARDIOGRAM REPORT: CPT | Performed by: EMERGENCY MEDICINE

## 2021-01-01 PROCEDURE — 3078F DIAST BP <80 MM HG: CPT | Performed by: HOSPITALIST

## 2021-01-01 PROCEDURE — 3008F BODY MASS INDEX DOCD: CPT | Performed by: INTERNAL MEDICINE

## 2021-01-01 PROCEDURE — 71045 X-RAY EXAM CHEST 1 VIEW: CPT | Performed by: INTERNAL MEDICINE

## 2021-01-01 PROCEDURE — 99239 HOSP IP/OBS DSCHRG MGMT >30: CPT | Performed by: HOSPITALIST

## 2021-01-01 PROCEDURE — 70450 CT HEAD/BRAIN W/O DYE: CPT | Performed by: EMERGENCY MEDICINE

## 2021-01-01 PROCEDURE — 93005 ELECTROCARDIOGRAM TRACING: CPT

## 2021-01-01 PROCEDURE — 1111F DSCHRG MED/CURRENT MED MERGE: CPT | Performed by: INTERNAL MEDICINE

## 2021-01-01 PROCEDURE — 70450 CT HEAD/BRAIN W/O DYE: CPT | Performed by: HOSPITALIST

## 2021-01-01 PROCEDURE — 87186 SC STD MICRODIL/AGAR DIL: CPT

## 2021-01-01 PROCEDURE — 3E0S33Z INTRODUCTION OF ANTI-INFLAMMATORY INTO EPIDURAL SPACE, PERCUTANEOUS APPROACH: ICD-10-PCS | Performed by: ANESTHESIOLOGY

## 2021-01-01 PROCEDURE — 70544 MR ANGIOGRAPHY HEAD W/O DYE: CPT | Performed by: EMERGENCY MEDICINE

## 2021-01-01 PROCEDURE — 80048 BASIC METABOLIC PNL TOTAL CA: CPT | Performed by: EMERGENCY MEDICINE

## 2021-01-01 PROCEDURE — 84484 ASSAY OF TROPONIN QUANT: CPT | Performed by: EMERGENCY MEDICINE

## 2021-01-01 PROCEDURE — 3008F BODY MASS INDEX DOCD: CPT | Performed by: HOSPITALIST

## 2021-01-01 PROCEDURE — 3077F SYST BP >= 140 MM HG: CPT | Performed by: INTERNAL MEDICINE

## 2021-01-01 PROCEDURE — 3078F DIAST BP <80 MM HG: CPT | Performed by: INTERNAL MEDICINE

## 2021-01-01 PROCEDURE — 83880 ASSAY OF NATRIURETIC PEPTIDE: CPT | Performed by: EMERGENCY MEDICINE

## 2021-01-01 PROCEDURE — 99223 1ST HOSP IP/OBS HIGH 75: CPT | Performed by: INTERNAL MEDICINE

## 2021-01-01 PROCEDURE — 3080F DIAST BP >= 90 MM HG: CPT | Performed by: INTERNAL MEDICINE

## 2021-01-01 PROCEDURE — 72192 CT PELVIS W/O DYE: CPT | Performed by: EMERGENCY MEDICINE

## 2021-01-01 PROCEDURE — 99231 SBSQ HOSP IP/OBS SF/LOW 25: CPT | Performed by: NURSE PRACTITIONER

## 2021-01-01 PROCEDURE — 99222 1ST HOSP IP/OBS MODERATE 55: CPT | Performed by: INTERNAL MEDICINE

## 2021-01-01 PROCEDURE — 99223 1ST HOSP IP/OBS HIGH 75: CPT | Performed by: HOSPITALIST

## 2021-01-01 PROCEDURE — 93306 TTE W/DOPPLER COMPLETE: CPT | Performed by: NURSE PRACTITIONER

## 2021-01-01 PROCEDURE — 99306 1ST NF CARE HIGH MDM 50: CPT | Performed by: INTERNAL MEDICINE

## 2021-01-01 PROCEDURE — 99223 1ST HOSP IP/OBS HIGH 75: CPT | Performed by: OTHER

## 2021-01-01 PROCEDURE — 3E0S3BZ INTRODUCTION OF ANESTHETIC AGENT INTO EPIDURAL SPACE, PERCUTANEOUS APPROACH: ICD-10-PCS | Performed by: ANESTHESIOLOGY

## 2021-01-01 PROCEDURE — 3077F SYST BP >= 140 MM HG: CPT | Performed by: HOSPITALIST

## 2021-01-01 PROCEDURE — 72148 MRI LUMBAR SPINE W/O DYE: CPT | Performed by: HOSPITALIST

## 2021-01-01 PROCEDURE — 93971 EXTREMITY STUDY: CPT | Performed by: EMERGENCY MEDICINE

## 2021-01-01 PROCEDURE — 87086 URINE CULTURE/COLONY COUNT: CPT

## 2021-01-01 PROCEDURE — 93970 EXTREMITY STUDY: CPT | Performed by: EMERGENCY MEDICINE

## 2021-01-01 PROCEDURE — 76770 US EXAM ABDO BACK WALL COMP: CPT | Performed by: INTERNAL MEDICINE

## 2021-01-01 PROCEDURE — 71045 X-RAY EXAM CHEST 1 VIEW: CPT | Performed by: EMERGENCY MEDICINE

## 2021-01-01 PROCEDURE — 85025 COMPLETE CBC W/AUTO DIFF WBC: CPT | Performed by: EMERGENCY MEDICINE

## 2021-01-01 PROCEDURE — 99221 1ST HOSP IP/OBS SF/LOW 40: CPT | Performed by: HOSPITALIST

## 2021-01-01 PROCEDURE — 96361 HYDRATE IV INFUSION ADD-ON: CPT

## 2021-01-01 PROCEDURE — 85379 FIBRIN DEGRADATION QUANT: CPT | Performed by: EMERGENCY MEDICINE

## 2021-01-01 PROCEDURE — 80048 BASIC METABOLIC PNL TOTAL CA: CPT | Performed by: INTERNAL MEDICINE

## 2021-01-01 PROCEDURE — 99285 EMERGENCY DEPT VISIT HI MDM: CPT

## 2021-01-01 PROCEDURE — C9113 INJ PANTOPRAZOLE SODIUM, VIA: HCPCS | Performed by: EMERGENCY MEDICINE

## 2021-01-01 RX ORDER — METOPROLOL SUCCINATE 25 MG/1
25 TABLET, EXTENDED RELEASE ORAL DAILY
Status: DISCONTINUED | OUTPATIENT
Start: 2021-01-01 | End: 2021-01-01

## 2021-01-01 RX ORDER — POLYETHYLENE GLYCOL 3350 17 G/17G
17 POWDER, FOR SOLUTION ORAL DAILY PRN
Status: DISCONTINUED | OUTPATIENT
Start: 2021-01-01 | End: 2021-01-01

## 2021-01-01 RX ORDER — ALBUTEROL SULFATE 2.5 MG/3ML
2.5 SOLUTION RESPIRATORY (INHALATION) EVERY 4 HOURS PRN
Status: DISCONTINUED | OUTPATIENT
Start: 2021-01-01 | End: 2021-01-01

## 2021-01-01 RX ORDER — SODIUM CHLORIDE 1000 MG
1 TABLET, SOLUBLE MISCELLANEOUS ONCE
Status: COMPLETED | OUTPATIENT
Start: 2021-01-01 | End: 2021-01-01

## 2021-01-01 RX ORDER — MAGNESIUM OXIDE 400 MG (241.3 MG MAGNESIUM) TABLET
400 TABLET ONCE
Status: COMPLETED | OUTPATIENT
Start: 2021-01-01 | End: 2021-01-01

## 2021-01-01 RX ORDER — AMITRIPTYLINE HYDROCHLORIDE 10 MG/1
10 TABLET, FILM COATED ORAL NIGHTLY
COMMUNITY

## 2021-01-01 RX ORDER — MORPHINE SULFATE 2 MG/ML
1 INJECTION, SOLUTION INTRAMUSCULAR; INTRAVENOUS
Status: DISCONTINUED | OUTPATIENT
Start: 2021-01-01 | End: 2021-01-01

## 2021-01-01 RX ORDER — DILTIAZEM HYDROCHLORIDE 5 MG/ML
INJECTION INTRAVENOUS
Status: DISPENSED
Start: 2021-01-01 | End: 2021-01-01

## 2021-01-01 RX ORDER — LORAZEPAM 2 MG/ML
1 INJECTION INTRAMUSCULAR EVERY 4 HOURS PRN
Status: DISCONTINUED | OUTPATIENT
Start: 2021-01-01 | End: 2021-01-01

## 2021-01-01 RX ORDER — DEXAMETHASONE SODIUM PHOSPHATE 10 MG/ML
6 INJECTION, SOLUTION INTRAMUSCULAR; INTRAVENOUS ONCE
Status: COMPLETED | OUTPATIENT
Start: 2021-01-01 | End: 2021-01-01

## 2021-01-01 RX ORDER — HEPARIN SODIUM AND DEXTROSE 10000; 5 [USP'U]/100ML; G/100ML
INJECTION INTRAVENOUS CONTINUOUS
Status: DISPENSED | OUTPATIENT
Start: 2021-01-01 | End: 2021-01-01

## 2021-01-01 RX ORDER — AMIODARONE HYDROCHLORIDE 200 MG/1
200 TABLET ORAL
Qty: 39 TABLET | Refills: 0 | Status: SHIPPED | OUTPATIENT
Start: 2021-01-01 | End: 2021-01-01

## 2021-01-01 RX ORDER — DILTIAZEM HYDROCHLORIDE 5 MG/ML
10 INJECTION INTRAVENOUS ONCE
Status: COMPLETED | OUTPATIENT
Start: 2021-01-01 | End: 2021-01-01

## 2021-01-01 RX ORDER — GLYCOPYRROLATE 0.2 MG/ML
0.2 INJECTION, SOLUTION INTRAMUSCULAR; INTRAVENOUS
Status: DISCONTINUED | OUTPATIENT
Start: 2021-01-01 | End: 2021-01-01

## 2021-01-01 RX ORDER — SODIUM CHLORIDE 0.9 % (FLUSH) 0.9 %
10 SYRINGE (ML) INJECTION AS NEEDED
Status: DISCONTINUED | OUTPATIENT
Start: 2021-01-01 | End: 2021-01-01

## 2021-01-01 RX ORDER — ACETAMINOPHEN 650 MG/1
650 SUPPOSITORY RECTAL EVERY 4 HOURS PRN
Status: DISCONTINUED | OUTPATIENT
Start: 2021-01-01 | End: 2021-01-01

## 2021-01-01 RX ORDER — HEPARIN SODIUM 5000 [USP'U]/ML
5000 INJECTION, SOLUTION INTRAVENOUS; SUBCUTANEOUS EVERY 12 HOURS SCHEDULED
Status: DISCONTINUED | OUTPATIENT
Start: 2021-01-01 | End: 2021-01-01

## 2021-01-01 RX ORDER — FUROSEMIDE 20 MG/1
20 TABLET ORAL
Status: DISCONTINUED | OUTPATIENT
Start: 2021-01-01 | End: 2021-01-01

## 2021-01-01 RX ORDER — ALBUMIN (HUMAN) 12.5 G/50ML
12.5 SOLUTION INTRAVENOUS ONCE
Status: COMPLETED | OUTPATIENT
Start: 2021-01-01 | End: 2021-01-01

## 2021-01-01 RX ORDER — FUROSEMIDE 10 MG/ML
20 INJECTION INTRAMUSCULAR; INTRAVENOUS ONCE
Status: COMPLETED | OUTPATIENT
Start: 2021-01-01 | End: 2021-01-01

## 2021-01-01 RX ORDER — MORPHINE SULFATE 4 MG/ML
2 INJECTION, SOLUTION INTRAMUSCULAR; INTRAVENOUS ONCE
Status: COMPLETED | OUTPATIENT
Start: 2021-01-01 | End: 2021-01-01

## 2021-01-01 RX ORDER — PANTOPRAZOLE SODIUM 40 MG/1
40 TABLET, DELAYED RELEASE ORAL
Refills: 0 | Status: SHIPPED | COMMUNITY
Start: 2021-01-01

## 2021-01-01 RX ORDER — FUROSEMIDE 20 MG/1
20 TABLET ORAL ONCE
Status: COMPLETED | OUTPATIENT
Start: 2021-01-01 | End: 2021-01-01

## 2021-01-01 RX ORDER — MELATONIN
325
Status: DISCONTINUED | OUTPATIENT
Start: 2021-01-01 | End: 2021-01-01

## 2021-01-01 RX ORDER — AMIODARONE HYDROCHLORIDE 200 MG/1
200 TABLET ORAL DAILY
COMMUNITY

## 2021-01-01 RX ORDER — FUROSEMIDE 10 MG/ML
20 INJECTION INTRAMUSCULAR; INTRAVENOUS ONCE
Status: DISCONTINUED | OUTPATIENT
Start: 2021-01-01 | End: 2021-01-01

## 2021-01-01 RX ORDER — AMLODIPINE BESYLATE 5 MG/1
5 TABLET ORAL DAILY
Status: DISCONTINUED | OUTPATIENT
Start: 2021-01-01 | End: 2021-01-01

## 2021-01-01 RX ORDER — FAMOTIDINE 20 MG/1
20 TABLET ORAL DAILY PRN
COMMUNITY

## 2021-01-01 RX ORDER — SODIUM CHLORIDE 9 MG/ML
INJECTION, SOLUTION INTRAVENOUS CONTINUOUS
Status: DISCONTINUED | OUTPATIENT
Start: 2021-01-01 | End: 2021-01-01

## 2021-01-01 RX ORDER — TRAMADOL HYDROCHLORIDE 50 MG/1
50 TABLET ORAL EVERY 12 HOURS PRN
Qty: 20 TABLET | Refills: 0 | Status: SHIPPED | OUTPATIENT
Start: 2021-01-01

## 2021-01-01 RX ORDER — BISACODYL 10 MG
10 SUPPOSITORY, RECTAL RECTAL
Status: DISCONTINUED | OUTPATIENT
Start: 2021-01-01 | End: 2021-01-01

## 2021-01-01 RX ORDER — HALOPERIDOL 5 MG/ML
1 INJECTION INTRAMUSCULAR
Status: DISCONTINUED | OUTPATIENT
Start: 2021-01-01 | End: 2021-01-01

## 2021-01-01 RX ORDER — MORPHINE SULFATE 2 MG/ML
1 INJECTION, SOLUTION INTRAMUSCULAR; INTRAVENOUS EVERY 2 HOUR PRN
Status: DISCONTINUED | OUTPATIENT
Start: 2021-01-01 | End: 2021-01-01

## 2021-01-01 RX ORDER — POTASSIUM CHLORIDE 20 MEQ/1
20 TABLET, EXTENDED RELEASE ORAL ONCE
Status: COMPLETED | OUTPATIENT
Start: 2021-01-01 | End: 2021-01-01

## 2021-01-01 RX ORDER — BISACODYL 10 MG
10 SUPPOSITORY, RECTAL RECTAL
Status: CANCELLED | OUTPATIENT
Start: 2021-01-01

## 2021-01-01 RX ORDER — AMIODARONE HYDROCHLORIDE 200 MG/1
200 TABLET ORAL
Status: DISCONTINUED | OUTPATIENT
Start: 2021-01-01 | End: 2021-01-01

## 2021-01-01 RX ORDER — ASPIRIN 325 MG
325 TABLET ORAL ONCE
Status: DISCONTINUED | OUTPATIENT
Start: 2021-01-01 | End: 2021-01-01 | Stop reason: ALTCHOICE

## 2021-01-01 RX ORDER — FAMOTIDINE 20 MG/1
20 TABLET ORAL 2 TIMES DAILY PRN
Qty: 30 TABLET | Refills: 0 | Status: SHIPPED | OUTPATIENT
Start: 2021-01-01 | End: 2021-01-01

## 2021-01-01 RX ORDER — ATORVASTATIN CALCIUM 40 MG/1
40 TABLET, FILM COATED ORAL NIGHTLY
Refills: 0 | Status: SHIPPED | COMMUNITY
Start: 2021-01-01

## 2021-01-01 RX ORDER — HYDROCODONE BITARTRATE AND ACETAMINOPHEN 5; 325 MG/1; MG/1
1 TABLET ORAL EVERY 4 HOURS PRN
Qty: 30 TABLET | Refills: 0 | Status: ON HOLD | OUTPATIENT
Start: 2021-01-01 | End: 2021-01-01

## 2021-01-01 RX ORDER — MORPHINE SULFATE 2 MG/ML
0.5 INJECTION, SOLUTION INTRAMUSCULAR; INTRAVENOUS ONCE
Status: COMPLETED | OUTPATIENT
Start: 2021-01-01 | End: 2021-01-01

## 2021-01-01 RX ORDER — METOCLOPRAMIDE HYDROCHLORIDE 5 MG/ML
5 INJECTION INTRAMUSCULAR; INTRAVENOUS EVERY 8 HOURS PRN
Status: DISCONTINUED | OUTPATIENT
Start: 2021-01-01 | End: 2021-01-01

## 2021-01-01 RX ORDER — FUROSEMIDE 40 MG/1
40 TABLET ORAL ONCE
Status: COMPLETED | OUTPATIENT
Start: 2021-01-01 | End: 2021-01-01

## 2021-01-01 RX ORDER — COSYNTROPIN 0.25 MG/ML
0.25 INJECTION, POWDER, FOR SOLUTION INTRAMUSCULAR; INTRAVENOUS ONCE
Status: COMPLETED | OUTPATIENT
Start: 2021-01-01 | End: 2021-01-01

## 2021-01-01 RX ORDER — HYDROCODONE BITARTRATE AND ACETAMINOPHEN 5; 325 MG/1; MG/1
1 TABLET ORAL EVERY 4 HOURS PRN
Status: DISCONTINUED | OUTPATIENT
Start: 2021-01-01 | End: 2021-01-01

## 2021-01-01 RX ORDER — ASPIRIN 81 MG/1
324 TABLET, CHEWABLE ORAL ONCE
Status: COMPLETED | OUTPATIENT
Start: 2021-01-01 | End: 2021-01-01

## 2021-01-01 RX ORDER — MORPHINE SULFATE 4 MG/ML
4 INJECTION, SOLUTION INTRAMUSCULAR; INTRAVENOUS EVERY 2 HOUR PRN
Status: DISCONTINUED | OUTPATIENT
Start: 2021-01-01 | End: 2021-01-01

## 2021-01-01 RX ORDER — CARVEDILOL 12.5 MG/1
12.5 TABLET ORAL 2 TIMES DAILY WITH MEALS
Status: DISCONTINUED | OUTPATIENT
Start: 2021-01-01 | End: 2021-01-01

## 2021-01-01 RX ORDER — ALBUMIN (HUMAN) 12.5 G/50ML
25 SOLUTION INTRAVENOUS EVERY 8 HOURS
Status: COMPLETED | OUTPATIENT
Start: 2021-01-01 | End: 2021-01-01

## 2021-01-01 RX ORDER — ALBUMIN (HUMAN) 12.5 G/50ML
12.5 SOLUTION INTRAVENOUS EVERY 12 HOURS
Status: COMPLETED | OUTPATIENT
Start: 2021-01-01 | End: 2021-01-01

## 2021-01-01 RX ORDER — BUMETANIDE 0.25 MG/ML
2 INJECTION, SOLUTION INTRAMUSCULAR; INTRAVENOUS ONCE
Status: COMPLETED | OUTPATIENT
Start: 2021-01-01 | End: 2021-01-01

## 2021-01-01 RX ORDER — AMITRIPTYLINE HYDROCHLORIDE 10 MG/1
10 TABLET, FILM COATED ORAL NIGHTLY
Status: DISCONTINUED | OUTPATIENT
Start: 2021-01-01 | End: 2021-01-01

## 2021-01-01 RX ORDER — POLYETHYLENE GLYCOL 3350 17 G/17G
17 POWDER, FOR SOLUTION ORAL DAILY PRN
Refills: 0 | Status: SHIPPED | COMMUNITY
Start: 2021-01-01

## 2021-01-01 RX ORDER — LORAZEPAM 2 MG/ML
0.5 INJECTION INTRAMUSCULAR EVERY 4 HOURS PRN
Status: DISCONTINUED | OUTPATIENT
Start: 2021-01-01 | End: 2021-01-01

## 2021-01-01 RX ORDER — GABAPENTIN 300 MG/1
300 CAPSULE ORAL NIGHTLY
Status: DISCONTINUED | OUTPATIENT
Start: 2021-01-01 | End: 2021-01-01

## 2021-01-01 RX ORDER — VANCOMYCIN HYDROCHLORIDE 125 MG/1
125 CAPSULE ORAL DAILY
Qty: 5 CAPSULE | Refills: 0 | Status: SHIPPED | COMMUNITY
Start: 2021-01-01

## 2021-01-01 RX ORDER — AMIODARONE HYDROCHLORIDE 200 MG/1
200 TABLET ORAL DAILY
Status: DISCONTINUED | OUTPATIENT
Start: 2021-01-01 | End: 2021-01-01

## 2021-01-01 RX ORDER — ALBUMIN (HUMAN) 12.5 G/50ML
25 SOLUTION INTRAVENOUS ONCE
Status: COMPLETED | OUTPATIENT
Start: 2021-01-01 | End: 2021-01-01

## 2021-01-01 RX ORDER — DOXEPIN HYDROCHLORIDE 50 MG/1
1 CAPSULE ORAL DAILY
Status: DISCONTINUED | OUTPATIENT
Start: 2021-01-01 | End: 2021-01-01

## 2021-01-01 RX ORDER — FUROSEMIDE 20 MG/1
20 TABLET ORAL
Qty: 20 TABLET | Refills: 0 | Status: ON HOLD | OUTPATIENT
Start: 2021-01-01 | End: 2021-01-01

## 2021-01-01 RX ORDER — HYDROCODONE BITARTRATE AND ACETAMINOPHEN 5; 325 MG/1; MG/1
1 TABLET ORAL EVERY 4 HOURS PRN
Qty: 20 TABLET | Refills: 0 | Status: SHIPPED | OUTPATIENT
Start: 2021-01-01

## 2021-01-01 RX ORDER — TRAMADOL HYDROCHLORIDE 50 MG/1
50 TABLET ORAL EVERY 12 HOURS PRN
Status: DISCONTINUED | OUTPATIENT
Start: 2021-01-01 | End: 2021-01-01

## 2021-01-01 RX ORDER — FUROSEMIDE 20 MG/1
20 TABLET ORAL EVERY OTHER DAY
Status: DISCONTINUED | OUTPATIENT
Start: 2021-01-01 | End: 2021-01-01

## 2021-01-01 RX ORDER — MORPHINE SULFATE 2 MG/ML
1 INJECTION, SOLUTION INTRAMUSCULAR; INTRAVENOUS
Status: CANCELLED | OUTPATIENT
Start: 2021-01-01

## 2021-01-01 RX ORDER — MORPHINE SULFATE IN 0.9 % NACL 1 MG/ML
1 PLASTIC BAG, INJECTION (ML) INTRAVENOUS CONTINUOUS PRN
Status: DISCONTINUED | OUTPATIENT
Start: 2021-01-01 | End: 2021-01-01

## 2021-01-01 RX ORDER — SODIUM CHLORIDE 9 MG/ML
1000 INJECTION, SOLUTION INTRAVENOUS ONCE
Status: COMPLETED | OUTPATIENT
Start: 2021-01-01 | End: 2021-01-01

## 2021-01-01 RX ORDER — HEPARIN SODIUM 5000 [USP'U]/ML
5000 INJECTION, SOLUTION INTRAVENOUS; SUBCUTANEOUS EVERY 12 HOURS
Status: DISCONTINUED | OUTPATIENT
Start: 2021-01-01 | End: 2021-01-01

## 2021-01-01 RX ORDER — ONDANSETRON 2 MG/ML
4 INJECTION INTRAMUSCULAR; INTRAVENOUS EVERY 6 HOURS PRN
Status: DISCONTINUED | OUTPATIENT
Start: 2021-01-01 | End: 2021-01-01

## 2021-01-01 RX ORDER — MORPHINE SULFATE 2 MG/ML
2 INJECTION, SOLUTION INTRAMUSCULAR; INTRAVENOUS EVERY 2 HOUR PRN
Status: DISCONTINUED | OUTPATIENT
Start: 2021-01-01 | End: 2021-01-01

## 2021-01-01 RX ORDER — FUROSEMIDE 10 MG/ML
40 INJECTION INTRAMUSCULAR; INTRAVENOUS ONCE
Status: COMPLETED | OUTPATIENT
Start: 2021-01-01 | End: 2021-01-01

## 2021-01-01 RX ORDER — SCOLOPAMINE TRANSDERMAL SYSTEM 1 MG/1
1 PATCH, EXTENDED RELEASE TRANSDERMAL
Status: DISCONTINUED | OUTPATIENT
Start: 2021-01-01 | End: 2021-01-01

## 2021-01-01 RX ORDER — SODIUM CHLORIDE 9 MG/ML
INJECTION, SOLUTION INTRAVENOUS ONCE
Status: COMPLETED | OUTPATIENT
Start: 2021-01-01 | End: 2021-01-01

## 2021-01-01 RX ORDER — CARVEDILOL 12.5 MG/1
12.5 TABLET ORAL 2 TIMES DAILY WITH MEALS
Qty: 60 TABLET | Refills: 0 | Status: SHIPPED | OUTPATIENT
Start: 2021-01-01

## 2021-01-01 RX ORDER — ACETAMINOPHEN 325 MG/1
650 TABLET ORAL EVERY 4 HOURS PRN
Status: DISCONTINUED | OUTPATIENT
Start: 2021-01-01 | End: 2021-01-01

## 2021-01-01 RX ORDER — ACETAMINOPHEN 325 MG/1
650 TABLET ORAL EVERY 6 HOURS PRN
Status: DISCONTINUED | OUTPATIENT
Start: 2021-01-01 | End: 2021-01-01

## 2021-01-01 RX ORDER — METHYLPREDNISOLONE SODIUM SUCCINATE 40 MG/ML
40 INJECTION, POWDER, LYOPHILIZED, FOR SOLUTION INTRAMUSCULAR; INTRAVENOUS ONCE
Status: COMPLETED | OUTPATIENT
Start: 2021-01-01 | End: 2021-01-01

## 2021-01-01 RX ORDER — FUROSEMIDE 10 MG/ML
40 INJECTION INTRAMUSCULAR; INTRAVENOUS EVERY 8 HOURS PRN
Status: DISCONTINUED | OUTPATIENT
Start: 2021-01-01 | End: 2021-01-01

## 2021-01-01 RX ORDER — DOCUSATE SODIUM 100 MG/1
100 CAPSULE, LIQUID FILLED ORAL 2 TIMES DAILY
Status: DISCONTINUED | OUTPATIENT
Start: 2021-01-01 | End: 2021-01-01

## 2021-01-01 RX ORDER — HEPARIN SODIUM AND DEXTROSE 10000; 5 [USP'U]/100ML; G/100ML
12 INJECTION INTRAVENOUS ONCE
Status: COMPLETED | OUTPATIENT
Start: 2021-01-01 | End: 2021-01-01

## 2021-01-01 RX ORDER — AMITRIPTYLINE HYDROCHLORIDE 10 MG/1
10 TABLET, FILM COATED ORAL
Status: DISCONTINUED | OUTPATIENT
Start: 2021-01-01 | End: 2021-01-01

## 2021-01-01 RX ORDER — HEPARIN SODIUM 1000 [USP'U]/ML
30 INJECTION, SOLUTION INTRAVENOUS; SUBCUTANEOUS ONCE
Status: COMPLETED | OUTPATIENT
Start: 2021-01-01 | End: 2021-01-01

## 2021-01-01 RX ORDER — LIDOCAINE HYDROCHLORIDE 10 MG/ML
INJECTION, SOLUTION EPIDURAL; INFILTRATION; INTRACAUDAL; PERINEURAL AS NEEDED
Status: DISCONTINUED | OUTPATIENT
Start: 2021-01-01 | End: 2021-01-01 | Stop reason: HOSPADM

## 2021-01-01 RX ORDER — VALSARTAN 320 MG/1
320 TABLET ORAL DAILY
Status: DISCONTINUED | OUTPATIENT
Start: 2021-01-01 | End: 2021-01-01

## 2021-01-01 RX ORDER — TORSEMIDE 10 MG/1
10 TABLET ORAL DAILY
Status: SHIPPED | COMMUNITY
Start: 2021-01-01 | End: 2021-01-01

## 2021-01-01 RX ORDER — MORPHINE SULFATE 2 MG/ML
0.5 INJECTION, SOLUTION INTRAMUSCULAR; INTRAVENOUS EVERY 4 HOURS PRN
Status: DISCONTINUED | OUTPATIENT
Start: 2021-01-01 | End: 2021-01-01

## 2021-01-01 RX ORDER — FAMOTIDINE 20 MG/1
20 TABLET ORAL
Status: DISCONTINUED | OUTPATIENT
Start: 2021-01-01 | End: 2021-01-01

## 2021-01-01 RX ORDER — VALSARTAN 320 MG/1
320 TABLET ORAL DAILY
Qty: 30 TABLET | Refills: 0 | Status: ON HOLD | OUTPATIENT
Start: 2021-01-01 | End: 2021-01-01

## 2021-01-01 RX ORDER — HYDRALAZINE HYDROCHLORIDE 20 MG/ML
10 INJECTION INTRAMUSCULAR; INTRAVENOUS EVERY 6 HOURS PRN
Status: DISCONTINUED | OUTPATIENT
Start: 2021-01-01 | End: 2021-01-01

## 2021-01-01 RX ORDER — HEPARIN SODIUM 1000 [USP'U]/ML
60 INJECTION, SOLUTION INTRAVENOUS; SUBCUTANEOUS ONCE
Status: COMPLETED | OUTPATIENT
Start: 2021-01-01 | End: 2021-01-01

## 2021-01-01 RX ORDER — GABAPENTIN 100 MG/1
100 CAPSULE ORAL 3 TIMES DAILY
Status: DISCONTINUED | OUTPATIENT
Start: 2021-01-01 | End: 2021-01-01

## 2021-01-01 RX ORDER — VANCOMYCIN HYDROCHLORIDE 125 MG/1
125 CAPSULE ORAL DAILY
Status: DISCONTINUED | OUTPATIENT
Start: 2021-01-01 | End: 2021-01-01

## 2021-01-01 RX ORDER — TORSEMIDE 10 MG/1
10 TABLET ORAL
Refills: 0 | Status: SHIPPED | COMMUNITY
Start: 2021-01-01 | End: 2021-01-01

## 2021-01-01 RX ORDER — TRIAMCINOLONE ACETONIDE 40 MG/ML
INJECTION, SUSPENSION INTRA-ARTICULAR; INTRAMUSCULAR AS NEEDED
Status: DISCONTINUED | OUTPATIENT
Start: 2021-01-01 | End: 2021-01-01 | Stop reason: HOSPADM

## 2021-01-01 RX ORDER — PANTOPRAZOLE SODIUM 40 MG/1
40 TABLET, DELAYED RELEASE ORAL
Status: DISCONTINUED | OUTPATIENT
Start: 2021-01-01 | End: 2021-01-01

## 2021-01-01 RX ORDER — FAMOTIDINE 20 MG/1
20 TABLET ORAL DAILY PRN
Status: DISCONTINUED | OUTPATIENT
Start: 2021-01-01 | End: 2021-01-01

## 2021-01-01 RX ORDER — LORAZEPAM 2 MG/ML
2 INJECTION INTRAMUSCULAR EVERY 4 HOURS PRN
Status: DISCONTINUED | OUTPATIENT
Start: 2021-01-01 | End: 2021-01-01

## 2021-01-01 RX ORDER — IPRATROPIUM BROMIDE AND ALBUTEROL SULFATE 2.5; .5 MG/3ML; MG/3ML
3 SOLUTION RESPIRATORY (INHALATION)
Status: DISCONTINUED | OUTPATIENT
Start: 2021-01-01 | End: 2021-01-01

## 2021-01-01 RX ORDER — METHYLPREDNISOLONE SODIUM SUCCINATE 40 MG/ML
40 INJECTION, POWDER, LYOPHILIZED, FOR SOLUTION INTRAMUSCULAR; INTRAVENOUS EVERY 12 HOURS
Status: DISCONTINUED | OUTPATIENT
Start: 2021-01-01 | End: 2021-01-01

## 2021-01-01 RX ORDER — HYDROCODONE BITARTRATE AND ACETAMINOPHEN 5; 325 MG/1; MG/1
2 TABLET ORAL EVERY 4 HOURS PRN
Status: DISCONTINUED | OUTPATIENT
Start: 2021-01-01 | End: 2021-01-01

## 2021-01-01 RX ORDER — METOPROLOL TARTRATE 5 MG/5ML
5 INJECTION INTRAVENOUS EVERY 6 HOURS
Status: DISCONTINUED | OUTPATIENT
Start: 2021-01-01 | End: 2021-01-01

## 2021-01-01 RX ORDER — ATORVASTATIN CALCIUM 40 MG/1
40 TABLET, FILM COATED ORAL NIGHTLY
Status: DISCONTINUED | OUTPATIENT
Start: 2021-01-01 | End: 2021-01-01

## 2021-02-26 ENCOUNTER — APPOINTMENT (OUTPATIENT)
Dept: CARDIOLOGY | Age: 86
End: 2021-02-26

## 2021-03-03 ENCOUNTER — OFFICE VISIT (OUTPATIENT)
Dept: CARDIOLOGY | Age: 86
End: 2021-03-03

## 2021-03-03 VITALS
WEIGHT: 120 LBS | DIASTOLIC BLOOD PRESSURE: 52 MMHG | HEART RATE: 60 BPM | BODY MASS INDEX: 22.08 KG/M2 | OXYGEN SATURATION: 100 % | HEIGHT: 62 IN | SYSTOLIC BLOOD PRESSURE: 112 MMHG

## 2021-03-03 DIAGNOSIS — E78.49 OTHER HYPERLIPIDEMIA: ICD-10-CM

## 2021-03-03 DIAGNOSIS — I35.0 NONRHEUMATIC AORTIC VALVE STENOSIS: ICD-10-CM

## 2021-03-03 DIAGNOSIS — R06.02 SOB (SHORTNESS OF BREATH): Primary | ICD-10-CM

## 2021-03-03 DIAGNOSIS — I10 ESSENTIAL HYPERTENSION: ICD-10-CM

## 2021-03-03 PROCEDURE — 99213 OFFICE O/P EST LOW 20 MIN: CPT | Performed by: INTERNAL MEDICINE

## 2021-03-03 RX ORDER — GABAPENTIN 300 MG/1
1 CAPSULE ORAL DAILY
COMMUNITY
Start: 2020-12-29

## 2021-03-03 ASSESSMENT — PATIENT HEALTH QUESTIONNAIRE - PHQ9
CLINICAL INTERPRETATION OF PHQ2 SCORE: NO FURTHER SCREENING NEEDED
1. LITTLE INTEREST OR PLEASURE IN DOING THINGS: NOT AT ALL
SUM OF ALL RESPONSES TO PHQ9 QUESTIONS 1 AND 2: 0
SUM OF ALL RESPONSES TO PHQ9 QUESTIONS 1 AND 2: 0
CLINICAL INTERPRETATION OF PHQ9 SCORE: NO FURTHER SCREENING NEEDED
2. FEELING DOWN, DEPRESSED OR HOPELESS: NOT AT ALL

## 2021-06-02 LAB
ANION GAP SERPL CALC-SCNC: 7 MMOL/L
BUN SERPL-MCNC: 34 MG/DL
BUN/CREAT SERPL: 20.7
CALCIUM SERPL-MCNC: 8.7 MG/DL
CHLORIDE SERPL-SCNC: 99 MMOL/L
CO2 SERPL-SCNC: 23 MMOL/L
CREAT SERPL-MCNC: 1.64 MG/DL
GLUCOSE SERPL-MCNC: 116 MG/DL
POTASSIUM SERPL-SCNC: 4.1 MMOL/L
SODIUM SERPL-SCNC: 129 MMOL/L

## 2021-06-03 RX ORDER — FUROSEMIDE 20 MG/1
20 TABLET ORAL DAILY
Qty: 30 TABLET | Refills: 2 | Status: SHIPPED | OUTPATIENT
Start: 2021-06-03

## 2021-06-03 NOTE — ED INITIAL ASSESSMENT (HPI)
Patient states, \"I feel like I have to yawn and it won't go through. \" Also reports left leg swelling. Denies chest pain. \"I can't get a deep breath. \" Speaking in full sentences.

## 2021-06-03 NOTE — ED PROVIDER NOTES
Patient Seen in: [facility] AND [facility] Emergency Department      History   Patient presents with:  Difficulty Breathing    Stated Complaint:     HPI/Subjective:   HPI    The patient is a 19-year-old female who presents with few days of the sensation like she General: She is not in acute distress. Appearance: She is well-developed. She is not ill-appearing. HENT:      Head: Normocephalic and atraumatic.       Mouth/Throat:      Mouth: Mucous membranes are moist.   Eyes:      Conjunctiva/sclera: Conjun components within normal limits   D-DIMER - Abnormal; Notable for the following components:    D-Dimer 1.29 (*)     All other components within normal limits   PRO BETA NATRIURETIC PEPTIDE - Abnormal; Notable for the following components:    Pro-Beta Natri worsen         MDM      Pulse Ox: 100%, Normal, room air    Cardiac Monitor: Pulse Readings from Last 1 Encounters:  06/02/21 : 64  , sinus, normal    Radiology findings: XR CHEST AP PORTABLE  (CPT=71045)    Result Date: 6/2/2021  CONCLUSION:  1.  No acute Tab  Take 1 tablet (20 mg total) by mouth 2 (two) times daily as needed for Heartburn.   Qty: 30 tablet Refills: 0

## 2021-06-10 PROBLEM — M54.59 INTRACTABLE LOW BACK PAIN: Status: ACTIVE | Noted: 2021-01-01

## 2021-06-10 PROBLEM — E87.2 METABOLIC ACIDOSIS: Status: ACTIVE | Noted: 2021-01-01

## 2021-06-10 PROBLEM — E87.1 HYPONATREMIA: Status: ACTIVE | Noted: 2021-01-01

## 2021-06-10 NOTE — H&P
[facility]    PATIENT'S NAME: Reagan Goltz   ATTENDING PHYSICIAN: Anthony Caldwell MD   PATIENT ACCOUNT#:   [id number]    LOCATION:  [address] [place] [address] [facility] RECORD #:   [id number]       YOB: 1928  ADMISSION DATE:       06/10 her right buttock and lateral aspect of her right thigh. Pain has been intractable to the point that she cannot find a comfortable position. She has not slept in the last 3 days per her report.   She did have similar pain in the past, but not to this yimi MD  d: 06/10/2021 14:38:10  t: 06/10/2021 16:08:11  [facility] [id number]  FB/

## 2021-06-10 NOTE — ED PROVIDER NOTES
Patient Seen in: [facility] Emergency Department      History   No chief complaint on file.     Stated Complaint: BLE edema     HPI/Subjective:   HPI    Patient is a 70-year-old female who arrives with daughter from home for right-sided low back pa Physical Exam    GENERAL: moderate  distress, awake and alert, lying on left side  HEENT: MMM, EOMI, PERRL  Neck: supple, non tender  CV: RRR, no murmurs, distal pulses intact  Resp: CTAB, no wheezes or retractions  Ab: soft, nontender, no distensi 80  , sinus     Radiology findings: CT PELVIS (CPT=72192)    Result Date: 6/10/2021  CONCLUSION:  1. 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 2. Moderate osteoarthritis left hip. 3. No acute fracture or suspicious bone lesion.  4. Old heal Clinical Impression:  Hyponatremia  (primary encounter diagnosis)  Metabolic acidosis  Intractable low back pain     Disposition:  Admit  6/10/2021  2:25 pm    Follow-up:  No follow-up provider specified.   We recommend that you schedule follow up care

## 2021-06-10 NOTE — ED NOTES
Orders for admission, patient is aware of plan and ready to go upstairs. Any questions, please call ED DAKOTAH wagner  at extension [phone].     Type of COVID test sent:rapid  COVID Suspicion level: Low    Titratable drug(s) infusin  Rate:0    LOC at time of t

## 2021-06-10 NOTE — PROGRESS NOTES
[facility] Pharmacy Note:  Renal Dose Adjustment    Sreedhar Dumont has been prescribed famotidine (PEPCID) 20 mg orally every 12 hours. Estimated Creatinine Clearance: 22.5 mL/min (A) (based on SCr of 1.26 mg/dL (H)).     Calculated creatinine clearance is

## 2021-06-11 PROCEDURE — 99222 1ST HOSP IP/OBS MODERATE 55: CPT | Performed by: NURSE PRACTITIONER

## 2021-06-11 NOTE — PROGRESS NOTES
[facility]    Progress Note    Gabriel Davies Patient Status:  Inpatient    1928 MRN [id number]   Location [facility] 5SW/SE Attending Monet Barry MD   Hosp Day # 1 PCP Tyler Elizabeth MD       Subjective:     Pt's neural foraminal stenosis at L3-L4. Additional significant levels detailed below: 2. L1-L2:  Mild central canal stenosis and left neural foraminal narrowing. 3. L2-L3:  Left neural foraminal stenosis.  4. Moderate chronic compression fracture in the T11 ve Will d/w MWH.  - LE u/s negative for DVT    Hx of CVA  - plavix on hold for now as above    Hx of HTN  - cont norvasc, toprol    dvt proph:    heparin      Code status:    Full       >35 minutes spent with >50% of time spent on counseling and coordination

## 2021-06-11 NOTE — PAYOR COMM NOTE
Hyponatremia - new     --------------  ADMISSION REVIEW     Payor: [facility] MEDICARE ADV PPO  Subscriber #:  [id number]  Authorization Number: [id number]       ED Provider Notes        History   No chief complaint on file.     Stated Complaint: BLE edema     HP on left side  HEENT: MMM, EOMI, PERRL  Neck: supple, non tender  CV: RRR, no murmurs, distal pulses intact  Resp: CTAB, no wheezes or retractions  Ab: soft, nontender, no distension  Back: TTP in right gluteal area.  No cvat or midline tenderness  Extremiti (MQT=45117)    Result Date: 6/10/2021  CONCLUSION:  1. No evidence of deep venous thrombosis in the bilateral lower extremities. However, the calf veins on the right could not be identified, and the left peroneal veins could not be visualized.     Dictated buttock and lateral aspect of her right thigh. Pain has been intractable to the point that she cannot find a comfortable position. She has not slept in the last 3 days per her report. She did have similar pain in the past, but not to this severity.   She CVA, HTN, and neuropathy who presented to ED one week ago with bilateral LE edema and was given IV lasix and discharged. She presented to ED again yesterday with \"weakness\" and low back and right LE Pain.  MRI with multilevel spondylosis and grade 1 anter Date Action Dose Route User    6/11/2021 0222 Given 40 mg Intravenous Ana Torres, RN      Metoprolol Succinate ER (Toprol XL) 24 hr tab 25 mg     Date Action Dose Route User    6/11/2021 0819 Given 25 mg Oral Sandra Butler, RN      morphINE sulfat

## 2021-06-11 NOTE — CONSULTS
[facility] Cardiology Consult    Reason for Consultation:  LE edema and hyponatremia    History of Present Illness:  Feliz Matute is a 80year old female followed by  for HTN mild aortic stenosis and intermittent LE edema.   Rajesh Catherine SR    General: Comfortable, well-nourished, in no acute distress   Neuro:awake/alert ALTMAN appropriate  HEENT: no JVD neck supple moist mucosa  Cardiac:S1 S2  Regular rate rhtym 2/6 systolic murmur  Lungs: clear equal  Abdomen: Soft, nontender, nondistended

## 2021-06-11 NOTE — PLAN OF CARE
A&Ox4.   Ruiz Freeze for increasing pain. IV fluids. Fall precautions maintained. Will continue to monitor.    Problem: Patient Centered Care  Goal: Patient preferences are identified and integrated in the patient's plan of care  Description: Interventions: gait  - Educate and engage patient/family in tolerated activity level and precautions  - Recommend use of  RW for transfers and ambulation  Outcome: Not Progressing

## 2021-06-11 NOTE — CM/SW NOTE
SW initiated self referral for discharge planning. SW met with patient at bedside to discuss discharge planning. Patient confirmed home address. Patient reports that she lives at home alone but has support from her daughters and granddaughter.  Patient r

## 2021-06-11 NOTE — CHRONIC PAIN
[facility] HOSP - [facility]  Report of Consultation    Eliza Chapa Patient Status:  Inpatient    1928 MRN [id number]   Location [facility] 5SW/SE Attending Sherly Toro MD   Hosp Day # 1 PCP Reina Gallegos MD     Date of Admissi mg, 40 mg, Intravenous, Q12H  •  0.9% NaCl infusion, , Intravenous, Continuous  •  acetaminophen (TYLENOL) tab 650 mg, 650 mg, Oral, Q6H PRN  •  ondansetron HCl (ZOFRAN) injection 4 mg, 4 mg, Intravenous, Q6H PRN  •  Metoclopramide HCl (REGLAN) injection 5 Strength intact in BLE       Laboratory Data:  Lab Results   Component Value Date    WBC 5.6 06/11/2021    HGB 11.8 06/11/2021    HCT 34.5 06/11/2021    .0 06/11/2021    CREATSERUM 0.99 06/11/2021    BUN 28 06/11/2021     06/11/2021    K 4.8  There is uncomplicated diverticulosis involving the visualized aspect of the sigmoid colon.       LUMBAR DISC LEVELS:   L1-L2: There is a generalized circumferential disc bulge resulting in left neural foraminal narrowing and mild central vertebral canal sigmoid colonic diverticulosis.                Dictated by (CST): Kg Hill MD on 6/10/2021 at 6:19 PM       Finalized by (CST): Kg Hill MD on 6/10/2021 at 6:30 PM          Impression:  Patient Active Problem List:     Hyponatremia     M Carmen Freire MD   Anesthesiology  Chronic Pain Medicine

## 2021-06-12 PROCEDURE — 99232 SBSQ HOSP IP/OBS MODERATE 35: CPT | Performed by: INTERNAL MEDICINE

## 2021-06-12 NOTE — OCCUPATIONAL THERAPY NOTE
OCCUPATIONAL THERAPY EVALUATION - INPATIENT     Room Number: 530/530-A  Evaluation Date: 6/12/2021  Type of Evaluation: Initial  Presenting Problem: lumbar radiculopathy; hyponatremia    Physician Order: IP Consult to Occupational Therapy  Reason for AMRIT JAIME [facility] ALEJO pending progress to return pt to PLOF. DISCHARGE RECOMMENDATIONS  OT Discharge Recommendations: Sub-acute rehabilitation (pending progress)       PLAN  OT Treatment Plan: Balance activities; Energy conservation/work simplification techniques;ADL tra limits     STRENGTH ASSESSMENT  BUEs: 4+/5    ACTIVITIES OF DAILY LIVING ASSESSMENT  AM-PAC ‘6-Clicks’ Inpatient Daily Activity Short Form  How much help from another person does the patient currently need…  -   Putting on and taking off regular lower body

## 2021-06-12 NOTE — PROGRESS NOTES
[facility]    Progress Note    Yeyo Echeverria Patient Status:  Inpatient    1928 MRN [id number]   Location [facility] 5SW/SE Attending Yoav Mata MD   Hosp Day # 2 PCP Madelaine Bess MD       Subjective:     Feel Assessment and Plan:     Hypoosmolar hyponatremia  Mild metabolic acidosis  Dehydration  Mostly likely due to lasix and dehydration  Improved.   - stop IVF  - off lasix  - fluid restriction  - follow bmp     Intractable low back pain with lumbar radicul

## 2021-06-12 NOTE — CHRONIC PAIN
[facility] - [facility]  Anesthesiology Pain Management Progress Note      Patient name: Maria Luisa Perez 80year old female  : 1928  MRN: [id number]    Diagnosis:  Hyponatremia  (primary encounter diagnosis)  Metabolic acidosis  Intractable Oral, Nightly  Scheduled Meds:  • carvedilol  12.5 mg Oral BID with meals   • Heparin Sodium (Porcine)  5,000 Units Subcutaneous Q12H   • MethylPREDNISolone Sodium Succ  40 mg Intravenous Q12H   • Amitriptyline HCl  10 mg Oral Q48H   • ferrous sulfate  325 protrusio acetabula. 2. Moderate osteoarthritis left hip. 3. No acute fracture or suspicious bone lesion. 4. Old healed bilateral superior and inferior pubic ramus fracture. 5. Advanced degenerative changes in the lower lumbar spine.      Dictated by (CST): caution   Injection could be scheduled as outpatient if patient medically stable for discharge and continues to tolerate norco       Total time:16min    ROSCOE ANAYA  6/12/2021  Anesthesia Chronic Pain Service [phone]

## 2021-06-12 NOTE — PROGRESS NOTES
[facility]    Progress Note    Thais Kc Patient Status:  Inpatient    1928 MRN [id number]   Location [facility] 5SW/SE Attending Judy Zamarripa MD   Hosp Day # 2 PCP Galdino Bennett MD     CARDIOLOGY ATTENDING in the lower lumbar spine. Dictated by (CST): Gwendolyn Hurley MD on 6/10/2021 at 1:59 PM     Finalized by (CST): Gwendolyn Hurley MD on 6/10/2021 at 2:11 PM          MRI SPINE LUMBAR (NYN=[id number])    Result Date: 6/10/2021  CONCLUSION:  1.  Mild-to-moderate reviewed - outlined by IM    Remote TIA   -Plavix           Mayra Tijerina, APRN  6/12/2021

## 2021-06-12 NOTE — PLAN OF CARE
A&Ox4.   Lozenge given for throat pain. PT/OT to evaluate patient in AM.   No acute changes. Fall precautions maintained, will continue to monitor.    Problem: Patient Centered Care  Goal: Patient preferences are identified and integrated in the patient increasing activity/tolerance for mobility and gait  - Educate and engage patient/family in tolerated activity level and precautions  - Recommend use of  rolling chair for transfers and ambulation  Outcome: Not Progressing

## 2021-06-13 PROCEDURE — 99233 SBSQ HOSP IP/OBS HIGH 50: CPT | Performed by: INTERNAL MEDICINE

## 2021-06-13 NOTE — PROGRESS NOTES
[facility] HOSP - [facility]    Progress Note    Thais Kc Patient Status:  Inpatient    1928 MRN [id number]   Location [facility] 3W/SW Attending Judy Zamarripa MD   Hosp Day # 3 PCP Galdino Bennett MD       Subjective:     RRT a solu medrol  - cont neurontin  - norco and morphine prn  - PT/OT    New A-fib with RVR  HR improved. Was bradycardic on dilt, metoprolol.  - cont amidarone gtt per cards  - cont PO admiodarone  - heparin gtt.      - Echo this admit with EF 60-65%, grade 2

## 2021-06-13 NOTE — CHRONIC PAIN
[facility]  Anesthesiology Pain Management Progress Note      Patient name: Jeremi Richardson 80year old female  : 1928  MRN: [id number]    Diagnosis: [unfilled]    Reason for Consult: back pain  Current hospital day: Hospital Day: 4

## 2021-06-13 NOTE — PHYSICAL THERAPY NOTE
PHYSICAL THERAPY EVALUATION - INPATIENT     Room Number: 333/333-A  Evaluation Date: 6/13/2021  Type of Evaluation: Initial   Physician Order: PT Eval and Treat    Presenting Problem: Hyponatremia, metabolic acidosis, dehydration  Reason for Therapy: Liam negotiating. Ambulation is at [facility] level right now as well. Patient will benefit from continued IP PT services to address these deficits in preparation for discharge.     DISCHARGE RECOMMENDATIONS  PT Discharge Recommendations: Sub-acute rehabilitation limited    BALANCE           Dynamic Standing: Fair    ADDITIONAL TESTS                                    NEUROLOGICAL FINDINGS   No c/o numbness or tingling this session                   ACTIVITY TOLERANCE                         O2 WALK       AM-PAC '6 level: independent     Goal #1   Current Status    Goal #2 Patient is able to demonstrate transfers Sit to/from Stand at assistance level: supervision with walker - rolling     Goal #2  Current Status    Goal #3 Patient is able to ambulate 50 feet with ass

## 2021-06-13 NOTE — PLAN OF CARE
No new complaints. IV heparin gtt and amiodarone gtt started today. Cardizem off. 2x assist with RW to/from chair, rolling chair for bathroom needs.     Problem: Patient Centered Care  Goal: Patient preferences are identified and integrated in the patient's Instruct patient on fluid and nutrition restrictions as appropriate  6/13/2021 1154 by Clayton Noble RN  Outcome: Progressing  6/13/2021 1153 by Clayton Noble RN  Outcome: Progressing     Problem: Impaired Functional Mobility  Goal: Achieve highest/safest

## 2021-06-13 NOTE — PROGRESS NOTES
RRT    *See RRT Documentation Record*    Reason the RRT was called: New onset afib  Assessment of patient leading up to RRT: vitals, physical assessment  Interventions/Testing: EKG, cardizem   Patient Outcome/Disposition: transfer to tele  Family Notified:

## 2021-06-13 NOTE — PLAN OF CARE
Patient transferred to 3rd floor S/P rapid response for afib w/RVR. Cardizem GTT started at 10ml/hr after bolus given. PO tablet just given as well. Panchito papers given to patient about cardizem. Echo shows EF 60-65% w/G2DD.  Patient as 1 assist w/walker pe highest/safest level of mobility/gait  Description: Interventions:  - Assess patient's functional ability and stability  - Promote increasing activity/tolerance for mobility and gait  - Educate and engage patient/family in tolerated activity level and prec

## 2021-06-13 NOTE — PROGRESS NOTES
Rapid response note    Rapid response called for new onset A. fib with rapid ventricular response. Patient admitted for hyperosmolar hyponatremia, metabolic acidosis and dehydration. Patient also with intractable low back pain and spinal stenosis.   Suzan

## 2021-06-13 NOTE — PROGRESS NOTES
[facility]    Progress Note    Tamanna Mcneil Patient Status:  Inpatient    1928 MRN [id number]   Location [facility] 3W/SW Attending Connie Zhao MD   Hosp Day # 3 PCP Jaya Gibbons MD     CARDIOLOGY ATTENDING RVR, new onset per patient and daughter- however does have hx of TIA and was on coumadin years ago  - rates were elevated 140/150 at 2 am patient was asymptomatic received IV and PO diltiazem along with her scheduled coreg- patient is  now bradycardiac sto

## 2021-06-13 NOTE — PLAN OF CARE
Problem: Patient Centered Care  Goal: Patient preferences are identified and integrated in the patient's plan of care  Description: Interventions:  - What would you like us to know as we care for you?  From home alone   - Provide timely, complete, and acc for transfers and ambulation  Outcome: Progressing     Patient still having pain, norco given with relief of pain. Plavix still on hold. High fall risk: bed low and in locked position, call light within reach, nonskid socks applied. VSS.  Will continue to m

## 2021-06-14 PROCEDURE — 99232 SBSQ HOSP IP/OBS MODERATE 35: CPT | Performed by: INTERNAL MEDICINE

## 2021-06-14 NOTE — PLAN OF CARE
VS stable. On room air. NSR on telemetry. Heparin gtt infusing. Patient is alert and oriented x4. [place]. Denies any pain at this time at rest. Fluid restriction. Patient updated on POC and safety precautions.     Problem: Patient Centered Care  Goal: Patient patient's functional ability and stability  - Promote increasing activity/tolerance for mobility and gait  - Educate and engage patient/family in tolerated activity level and precautions    Outcome: Progressing     Problem: CARDIOVASCULAR - ADULT  Goal: Ma

## 2021-06-14 NOTE — PROGRESS NOTES
[facility]    Progress Note    Manuel Ward Patient Status:  Inpatient    1928 MRN [id number]   Location [facility] 3W/SW Attending John Redmond MD   Hosp Day # 4 PCP Joanie Ambrose MD     HPI/Subjective:     C 06/13/2021 at 10:04 by Satya Bustillo DO      Assessment & Plan:     Afib RVR  - new onset per patient and daughter  - hx of TIA and was on coumadin years ago  - rates were elevated 140-150, asymptomatic.   Received IV and PO diltiazem and scheduled coreg,

## 2021-06-14 NOTE — PAYOR COMM NOTE
--------------  CONTINUED STAY REVIEW    Zach Knapp MEDICARE ADV PPO  Subscriber #:  [id number]  Authorization Number: [id number]        6/11 CARDS    Assessment:  · Hyponatremia with Na 121 on admit, improved today to 127.   Lasix held getting NS per IN s&s of hypervolemia on exam      HTN   - variable- change toprol to coreg 12.5 bid stop norvasc   -continue to hold valsartan and lasix for now- await NA           6/12 PAIN    Anticoagulation:  Plavix last dose 6/10     Assessment/Reccomendations:  Discus AS      Lab Results   Component Value Date     WBC 9.7 06/13/2021     HGB 11.8 (L) 06/13/2021     HCT 35.4 06/13/2021     .0 06/13/2021     CREATSERUM 1.10 (H) 06/13/2021     BUN 40 (H) 06/13/2021      (L) 06/13/2021     K 4.8 06/13/2021     C Ronan Issa RN    6/13/2021 1829 Hi-Risk Rate/Dose Change 500 Units/hr Intravenous Waukee Jean RN      gabapentin (NEURONTIN) cap 300 mg     Date Action Dose Route User    6/13/2021 2122 Given 300 mg Oral Ronan Issa RN      HYDROcodone-acetaminoph

## 2021-06-14 NOTE — CHRONIC PAIN
[facility] - [facility]  Inpatient Pain Management Progress Note      Patient name: Johanna Grubbs 80year old female  : 1928  MRN: [id number]    Diagnosis: Hyponatremia  (primary encounter diagnosis)  Metabolic acidosis  Intractable low ba famoTIDine    Exam:Blood pressure 138/56, pulse 64, temperature 97.7 °F (36.5 °C), temperature source Oral, resp. rate 18, height 5' 2\" (1.575 m), weight 130 lb 6.4 oz (59.1 kg), SpO2 100 %.     General: Alert and oriented x3, NAD, appears stated age, appr

## 2021-06-14 NOTE — CM/SW NOTE
08: 40AM  Per chart review, PT/OT recommend SNF at time of DC. SW sent tentative SNF referrals via Aidin for review. SW to f/up w/ pt to discuss SNF recommendation and choices when list generated by Aidin (approx 2PM today). DON screen requested.

## 2021-06-14 NOTE — PROGRESS NOTES
[facility]    Progress Note    Michell Raymundo Patient Status:  Inpatient    1928 MRN [id number]   Location [facility] 3W/SW Attending Julienne Nunes MD   Hosp Day # 4 PCP Evelio Lozano MD       Subjective:     Back hospital until [facility]. - cont solu medrol  - cont neurontin  - norco and morphine prn  - PT/OT     New A-fib with RVR  HR improved. NSR now. Was bradycardic on dilt, metoprolol.  - cont amidarone gtt per cards  - cont PO admiodarone  - heparin gtt.      - E

## 2021-06-14 NOTE — PLAN OF CARE
IV Solumedrol, and heparin GTT continue. Patient remains bradycardic, maintaining in low to mid 50's all night. No complaints. Fluid restriction maintained. Amiodarone all stopped before start of shift last night. Monitoring.       Problem: Patient Centered Interventions:  - Assess patient's functional ability and stability  - Promote increasing activity/tolerance for mobility and gait  - Educate and engage patient/family in tolerated activity level and precautions  Outcome: Progressing     Problem: CARDIOVAS

## 2021-06-14 NOTE — CDS QUERY
Present on Admission (POA)  Perla Smith    Dear Doctor:  Clinical information (provided below) does not conclusively specify if the condition was Present On Admission (POA).  In order to apply the POA indicator to the final set

## 2021-06-15 PROCEDURE — 99232 SBSQ HOSP IP/OBS MODERATE 35: CPT | Performed by: INTERNAL MEDICINE

## 2021-06-15 NOTE — PLAN OF CARE
Patient alert and oriented. On RA. Heparin at 4mL/hr. Purewick draining clear yellow urine. Complaints of pain this shift, PRN norco given. IV Solumedrol. IVF running per order. Plavix on hold for possible epidural Injection by the end of this week.  Plan f Functional Mobility  Goal: Achieve highest/safest level of mobility/gait  Description: Interventions:  - Assess patient's functional ability and stability  - Promote increasing activity/tolerance for mobility and gait  - Educate and engage patient/family i

## 2021-06-15 NOTE — CONSULTS
[facility]    Report of Consultation    Date of Admission:  6/10/2021  Date of Consult:  6/15/2021   Reason for Consultation:     ISMAEL     History of Present Illness:   Patient is a 80year old female with pmh of CVA, HTN, OA, CKD stage II Intravenous, Continuous  dilTIAZem BOLUS FROM BAG 10 mg infusion, 10 mg, Intravenous, Q1H PRN  heparin (PORCINE) drip [id number]/250mL infusion CONTINUOUS, 200-3,000 Units/hr, Intravenous, Continuous  amiodarone HCl (PACERONE) tab 200 mg, 200 mg, Oral, TID hematuria  Hematologic/lymphatic: negative for bleeding and easy bruising  Musculoskeletal:negative for back pain  Neurological: negative for gait problems  Behavioral/Psych: negative for anxiety and depression  Endocrine: negative for polyuria and weight Lab 06/13/21  1107 06/14/21  0730 06/15/21  0454   * 126* 123*   BUN 40* 42* 46*   CREATSERUM 1.10* 1.26* 1.41*   GFRAA 50* 43* 37*   GFRNAA 44* 37* 32*   CA 8.3* 8.9 8.3*   * 129* 133*   K 4.8 4.9 5.0    103 105   CO2 20.0* 20.0* 23.0

## 2021-06-15 NOTE — PROGRESS NOTES
[facility]    Progress Note    Soco Donaldson Patient Status:  Inpatient    1928 MRN [id number]   Location [facility] 3W/SW Attending Carlos Gutierrez MD   Hosp Day # 5 PCP Prosper Stern MD       Subjective:     Back per cards  - cont PO admiodarone  - heparin gtt.     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   Despite this finding pt's pain is coming more from back th

## 2021-06-15 NOTE — CM/SW NOTE
SW contacted pt's dtr/Nena via phone to f/up on SNF discussion. Nena stated she was unaware exact reasons for SNF recommendation and is confused as to why PT/OT have not seen pt since initial evaluations.     OSIEL consulted w/ pt's RN/Dona

## 2021-06-15 NOTE — PHYSICAL THERAPY NOTE
PHYSICAL THERAPY TREATMENT NOTE - INPATIENT     Room Number: 333/333-A       Presenting Problem: Hyponatremia, metabolic acidosis, dehydration    Problem List  Principal Problem:    Hyponatremia  Active Problems:    Metabolic acidosis    Intractable low ba needed with bed mobility and safe ambulation. If pt is not able to have 24/7 assistance PT recommending sub acute rehab.      DISCHARGE RECOMMENDATIONS  PT Discharge Recommendations: 24 hour care/supervision;Home with home health PT     PLAN  PT Treatment P STATUS  Gait Assessment   Gait Assistance: Contact guard assist  Distance (ft): 120  Assistive Device: Rolling walker  Pattern: Shuffle;R Decreased stance time;Comment (forward flexed posture)  Stoop/Curb Assistance: Not tested     Patient End of Session:

## 2021-06-15 NOTE — PROGRESS NOTES
[facility] HOSP - [facility]    Progress Note    Tree  Patient Status:  Inpatient    1928 MRN [id number]   Location [facility] 3W/SW Attending Magnolia Contreras MD   Hosp Day # 5 PCP Marco Antonio Saucedo MD     HPI/Subjective:     C bb w/ hold parameters. Monitor HR.  - CHADSVASc = 5 (age, sex, HF, HTN). Rec Eliquis 2.5mg bid (age, wt)  - AC risk vs benefits discussed with pt and daughter, agrees to [address] [facility].   Cont IV Heparin gtt until NOAC started per PCP, currently on hold d/t lumbar

## 2021-06-15 NOTE — PLAN OF CARE
An Garciax4, RA, heparin infusing, IV fluids infusing, ramírez inserted, being seen by pain clinic with plan to have steroid epidural, up to the bathroom with rolling chair, reports mild discomfort along right buttock, declining medication, daughter to ch level of mobility/gait  Description: Interventions:  - Assess patient's functional ability and stability  - Promote increasing activity/tolerance for mobility and gait  - Educate and engage patient/family in tolerated activity level and precautions    Outc

## 2021-06-15 NOTE — CHRONIC PAIN
[facility] - [facility]  Inpatient Pain Management Progress Note      Patient name: Jeremi Richardson 80year old female  : 1928  MRN: [id number]    Diagnosis: Hyponatremia  (primary encounter diagnosis)  Metabolic acidosis  Intractable low ba HYDROcodone-acetaminophen **OR** HYDROcodone-acetaminophen, famoTIDine    Exam:Blood pressure 134/55, pulse 58, temperature 97.8 °F (36.6 °C), temperature source Oral, resp. rate 19, height 5' 2\" (1.575 m), weight 131 lb 3.2 oz (59.5 kg), SpO2 100 %.     G

## 2021-06-16 NOTE — CHRONIC PAIN
[facility] - [facility]  Inpatient Pain Management Progress Note      Patient name: Thais Kc 80year old female  : 1928  MRN: [id number]    Diagnosis: Hyponatremia  (primary encounter diagnosis)  Metabolic acidosis  Intractable low ba pressure 144/48, pulse 58, temperature 97.6 °F (36.4 °C), temperature source Oral, resp. rate 18, height 5' 2\" (1.575 m), weight 130 lb 6.4 oz (59.1 kg), SpO2 95 %.     General: Alert and oriented x3, NAD, appears stated age, appropriate disposition and de

## 2021-06-16 NOTE — PLAN OF CARE
Patient alert and oriented. On RA. Arguello draining clear yellow urine. Heparin at 5mL/hr. On Albumin infusion Q12. IV Solumedrol. Complaints of pain this shift PRN norco given. Plan for SELIN by the end of the week.  Discharge rehab vs [facility] once medically clear mobility/gait  Description: Interventions:  - Assess patient's functional ability and stability  - Promote increasing activity/tolerance for mobility and gait  - Educate and engage patient/family in tolerated activity level and precautions  - Recommend pat effects  - Notify MD/LIP if interventions unsuccessful or patient reports new pain  - Anticipate increased pain with activity and pre-medicate as appropriate  Outcome: Progressing     Problem: RISK FOR INFECTION - ADULT  Goal: Absence of fever/infection du Progressing

## 2021-06-16 NOTE — OCCUPATIONAL THERAPY NOTE
OCCUPATIONAL THERAPY TREATMENT NOTE - INPATIENT        Room Number: 333/333-A    Presenting Problem: lumbar radiculopathy; hyponatremia    Problem List  Principal Problem:    Hyponatremia  Active Problems:    Metabolic acidosis    Intractable low back pain simplification techniques;ADL training;Functional transfer training; Endurance training;Patient/Family education;Patient/Family training;Equipment eval/education; Compensatory technique education    SUBJECTIVE  \"I want to go home already but they can't give met;Call light within reach;RN aware of session/findings; All patient questions and concerns addressed; Alarm set    OT Goals:     Patient will complete functional transfer with MOD I  Comment: min a     Patient will complete toileting with MOD I  Comment: N

## 2021-06-16 NOTE — CM/SW NOTE
SW received notice from PT/OT that they recommend Home w/ [facility] and 24/7 assistance OR SNF. SW met w/ pt in her room. SW explained current PT/OT recommendations. Pt is adamant about returning home at [place].     Pt confirmed she is agreeable to [facility] referrals and

## 2021-06-16 NOTE — PROGRESS NOTES
[facility]  Progress Note     Susie De La Torre  : 1928    Status: Inpatient  Day #: 6    Attending: Marjorie Russell MD  PCP: Omari Pina MD      Assessment and Plan     Hypoosmolar hyponatremia  Mild metabolic acidosis  Dehydratio distress  HEENT:  Normocephalic, atraumatic  Cardiac:  Regular rate, regular rhythm  Pulmonary:  Clear to auscultation bilaterally, respirations unlabored  Gastrointestinal:  Soft, non-tender, normal bowel sounds  Musculoskeletal:  No joint swelling  Extre --   --   --   --   --   --   --   --  3.3  --    PTT  --   --  26.3   < > 93.6*   < > 45.6*   < > 75.2* 75.7* 53.1*   TSH  --   --  1.210  --   --   --   --   --   --   --   --    CK 77  --   --   --   --   --   --   --   --   --   --     < > = values in

## 2021-06-16 NOTE — HOME CARE LIAISON
Received referral from [address] [place] [address]. Patient provided with list of [facility] providers from [facility], patient choice is [address]. Financial interest disclosure provided to patient. All questions addressed and answered.  Referral Reserved in Aidin and c

## 2021-06-17 NOTE — PHYSICAL THERAPY NOTE
PHYSICAL THERAPY TREATMENT NOTE - INPATIENT     Room Number: 333/333-A       Presenting Problem: Hyponatremia, metabolic acidosis, dehydration    Problem List  Principal Problem:    Hyponatremia  Active Problems:    Metabolic acidosis    Intractable low ba Research supports that patients with this level of impairment may benefit from ALEJO. Pt is declining rehab . Therapy reinforced with both pt and pt dtr need for 24 hr assist at DC .   Pt with decline in status from last PT note in chart, this was report TOLERANCE         resting bP 157/90 HR 57 O2 sats 98 %   After activity /45  HR 58 O2 sats 98 %         AM-PAC '6-Clicks' INPATIENT SHORT FORM - BASIC MOBILITY  How much difficulty does the patient currently have. ..  -   Turning over in bed (includin brief mod assist , pt reports LE Feeling weak today    Goal #3 Patient is able to ambulate 150 feet with assist device: walker - rolling at assistance level: supervision   Goal #3   Current Status  40 ft x 1 min assist    Goal #4     Goal #4   Current Stat

## 2021-06-17 NOTE — PROGRESS NOTES
[facility] HOSP - [facility]  Progress Note     Brayan Camargo  : 1928    Status: Inpatient  Day #: 7    Attending: Melinda Carrillo MD  PCP: Aby Ziegler MD      Assessment and Plan     Hypoosmolar hyponatremia  Mild metabolic acidosis  Dehydratio pt and daughter. +weakness. No CP, no SOB.       Objective     Temp:  [97.6 °F (36.4 °C)-98.1 °F (36.7 °C)] 98.1 °F (36.7 °C)  Pulse:  [57-65] 60  Resp:  [16-18] 18  BP: (145-170)/(44-92) 145/92  General:  Alert, no distress  HEENT:  Normocephalic, atraumat MG 1.8  --  2.1  --   --   --  2.0  --  2.0   PHOS  --   --   --   --   --   --  3.3  --   --    PTT 26.3   < > 93.6*   < > 45.6*   < > 75.7* 53.1* 51.8*   TSH 1.210  --   --   --   --   --   --   --   --     < > = values in this interval not displayed.

## 2021-06-17 NOTE — CM/SW NOTE
08: 20AM  Received notice from Shelby w/ [facility] that pt has been accepted and pt/family have chosen [facility] at time of DC.    04:30PM  Received email from pt's dtr Nena: Pt's PCP is requesting updates.     Kary Huffman and ANGUS Acevedo Ends via [phone] [employer]

## 2021-06-17 NOTE — PROGRESS NOTES
[facility]    Progress Note      Subjective:     Sitting comfortably in chair - daughter at bedside     Improved back pain and is scheduled for epidural injection     Review of Systems:     Constitutional: negative for fatigue, fevers and MG/5ML suspension 30 mL, 30 mL, Oral, Daily PRN  dilTIAZem BOLUS FROM BAG 10 mg infusion, 10 mg, Intravenous, Q1H PRN  heparin (PORCINE) drip 52876dzqzv/250mL infusion CONTINUOUS, 200-3,000 Units/hr, Intravenous, Continuous  amiodarone HCl (PACERONE) tab 2 06/16/21  1203 06/17/21  0429   *   < > 120* 124* 124*   BUN 46*   < > 48* 51* 48*   CREATSERUM 1.41*   < > 1.16* 1.21* 1.12*   GFRAA 37*   < > 47* 45* 49*   GFRNAA 32*   < > 41* 39* 43*   CA 8.3*   < > 8.0* 8.3* 8.6   ALB 3.0*  --  2.9*  --   --

## 2021-06-17 NOTE — PLAN OF CARE
Problem: Patient Centered Care  Goal: Patient preferences are identified and integrated in the patient's plan of care  Description: Interventions:  - What would you like us to know as we care for you?  From home alone   - Provide timely, complete, and acc distraction and/or relaxation techniques  - Monitor for opioid side effects  - Notify MD/LIP if interventions unsuccessful or patient reports new pain  - Anticipate increased pain with activity and pre-medicate as appropriate  Outcome: Progressing     Prob

## 2021-06-17 NOTE — PLAN OF CARE
Problem: Patient Centered Care  Goal: Patient preferences are identified and integrated in the patient's plan of care  Description: Interventions:  - What would you like us to know as we care for you?  From home alone   - Provide timely, complete, and acc Problem: CARDIOVASCULAR - ADULT  Goal: Maintains optimal cardiac output and hemodynamic stability  Description: INTERVENTIONS:  - Monitor vital signs, rhythm, and trends  - Monitor for bleeding, hypotension and signs of decreased cardiac output  - Evalua Administer growth factors as ordered  - Implement neutropenic guidelines  Outcome: Progressing     Problem: SAFETY ADULT - FALL  Goal: Free from fall injury  Description: INTERVENTIONS:  - Assess pt frequently for physical needs  - Identify cognitive and p and in lowest position, non skid socks, frequent nursing rounds.  Will continue to monitor

## 2021-06-17 NOTE — CHRONIC PAIN
[facility]OSP - [facility]  Inpatient Pain Management Progress Note      Patient name: Ben Hayden 80year old female  : 1928  MRN: [id number]    Diagnosis: Hyponatremia  (primary encounter diagnosis)  Metabolic acidosis  Intractable low ba Metoclopramide HCl, morphINE sulfate **OR** morphINE sulfate **OR** morphINE sulfate, acetaminophen **OR** HYDROcodone-acetaminophen **OR** HYDROcodone-acetaminophen, famoTIDine    Exam:Blood pressure (!) 170/51, pulse 60, temperature 98.1 °F (36.7 °C), te discussing the care with the patients health care providers.      Chronic Pain Service 5-1981

## 2021-06-17 NOTE — PROGRESS NOTES
[facility]    Progress Note    Manuel Ward Patient Status:  Inpatient    1928 MRN [id number]   Location [facility] 3W/SW Attending Deven Martinez MD   Hosp Day # 6 PCP Joanie Ambrose MD       Subjective:   Stacy Opitz unusual rashes present, no abnormal bruising noted  Back/Spine: no abnormalities noted  Musculoskeletal: full ROM all extremities good strength  no deformities  Extremities: no edema, cyanosis  Neurological:  Grossly normal    Results:     Laboratory Data:

## 2021-06-18 NOTE — CM/SW NOTE
Received notice from Mariel 4 w/ [facility] - informed that another [facility] agency has obtained insurance auth. OSIEL informed Kandi that pt and family has chosen [facility]. OSIEL contacted Marco Antonio Arango at [facility] and requested that they cancel the insurance auth.  OSIEL also requested

## 2021-06-18 NOTE — PROCEDURES
All risk explained accepted int ptt wnl  L45 interlaminar  epidural steroid injection  Sterile prep dress  l45 c arm 5 ml 1% lidocaine   18 gauge Tuohy anu on 2nd pass   80 mg kenologue in 2 ml 0.9ns tolerated well images stored and retrievable

## 2021-06-18 NOTE — PLAN OF CARE
Problem: Patient Centered Care  Goal: Patient preferences are identified and integrated in the patient's plan of care  Description: Interventions:  - What would you like us to know as we care for you?  From home alone   - Provide timely, complete, and acc Progressing  6/18/2021 0745 by Jonathan Morrow RN  Outcome: Progressing     Problem: Impaired Functional Mobility  Goal: Achieve highest/safest level of mobility/gait  Description: Interventions:  - Assess patient's functional ability and stability  - P INTERVENTIONS:  - Encourage pt to monitor pain and request assistance  - Assess pain using appropriate pain scale  - Administer analgesics based on type and severity of pain and evaluate response  - Implement non-pharmacological measures as appropriate and INTERVENTIONS:  - Identify barriers to discharge w/pt and caregiver  - Include patient/family/discharge partner in discharge planning  - Arrange for needed discharge resources and transportation as appropriate  - Identify discharge learning needs (meds, wo

## 2021-06-18 NOTE — PROGRESS NOTES
[facility]    Progress Note      Subjective:     Having BF this am - no sob or cp.     scheduled for epidural today     Review of Systems:     Constitutional: negative for fatigue, fevers and weight loss  Eyes: negative for irritation, re (MILK OF MAGNESIA) 400 MG/5ML suspension 30 mL, 30 mL, Oral, Daily PRN  dilTIAZem BOLUS FROM BAG 10 mg infusion, 10 mg, Intravenous, Q1H PRN  amiodarone HCl (PACERONE) tab 200 mg, 200 mg, Oral, TID CC  melatonin cap/tab 5 mg, 5 mg, Oral, Nightly  carvedilo < > 124* 124* 120*   BUN 46*   < > 48*   < > 51* 48* 47*   CREATSERUM 1.41*   < > 1.16*   < > 1.21* 1.12* 1.21*   GFRAA 37*   < > 47*   < > 45* 49* 45*   GFRNAA 32*   < > 41*   < > 39* 43* 39*   CA 8.3*   < > 8.0*   < > 8.3* 8.6 8.8   ALB 3.0*  --  2.9*

## 2021-06-18 NOTE — PLAN OF CARE
Problem: Patient Centered Care  Goal: Patient preferences are identified and integrated in the patient's plan of care  Description: Interventions:  - What would you like us to know as we care for you?  From home alone   - Provide timely, complete, and acc Problem: CARDIOVASCULAR - ADULT  Goal: Maintains optimal cardiac output and hemodynamic stability  Description: INTERVENTIONS:  - Monitor vital signs, rhythm, and trends  - Monitor for bleeding, hypotension and signs of decreased cardiac output  - Evalua Administer growth factors as ordered  - Implement neutropenic guidelines  Outcome: Progressing     Problem: SAFETY ADULT - FALL  Goal: Free from fall injury  Description: INTERVENTIONS:  - Assess pt frequently for physical needs  - Identify cognitive and p

## 2021-06-18 NOTE — PROGRESS NOTES
[facility] - [facility]  Progress Note     Gabriel Davies  : 1928    Status: Inpatient  Day #: 8    Attending: Cecilia Joseph MD  PCP: Tyler Elizabeth MD      Assessment and Plan     Hypoosmolar hyponatremia  Mild metabolic acidosis  Dehydratio pain and swelling has slightly improved to left leg. No CP, no SOB.       Objective     Temp:  [97.7 °F (36.5 °C)-97.9 °F (36.6 °C)] 97.8 °F (36.6 °C)  Pulse:  [53-65] 65  Resp:  [16-19] 18  BP: (133-171)/(43-90) 133/43  General:  Alert, no distress  HEEN --  103  --  105  --  105   < > 104 106 101   CO2 20.0*  --  20.0*  --  23.0  --  18.0*   < > 19.0* 20.0* 24.0   *  --  126*  --  123*  --  120*   < > 124* 124* 120*   MG 1.8  --  2.1  --   --   --  2.0  --   --  2.0  --    PHOS  --   --   --   --

## 2021-06-19 NOTE — PLAN OF CARE
Vitals stable. Eliquis started, provided med education. Arguello intact. Call light and belongings within reach.  Fall precaution in place    Problem: Patient Centered Care  Goal: Patient preferences are identified and integrated in the patient's plan of care activity/tolerance for mobility and gait  - Educate and engage patient/family in tolerated activity level and precautions  - Recommend use of chair position in bed 3 times per day  Outcome: Progressing     Problem: CARDIOVASCULAR - ADULT  Goal: Maintains o pre-medicate as appropriate  Outcome: Progressing     Problem: RISK FOR INFECTION - ADULT  Goal: Absence of fever/infection during anticipated neutropenic period  Description: INTERVENTIONS  - Monitor WBC  - Administer growth factors as ordered  - Calin

## 2021-06-19 NOTE — PROGRESS NOTES
[facility]  Progress Note     Eliza Chapa  : 1928    Status: Inpatient  Day #: 9    Attending: Lc Umana MD  PCP: Reina Gallegos MD      Assessment and Plan     Hypoosmolar hyponatremia  Mild metabolic acidosis  Dehydratio headache earlier today now improved  No CP, no SOB.       Objective     Temp:  [98 °F (36.7 °C)-98.3 °F (36.8 °C)] 98.1 °F (36.7 °C)  Pulse:  [55-69] 63  Resp:  [17-18] 18  BP: (125-172)/(51-55) 125/51  General:  Alert, no distress  HEENT:  Normocephalic, a 4.6   < > 4.9   < > 4.9  --  5.1 4.8      < > 105  --  105   < > 106   < > 101  --  103 102   CO2 20.0*   < > 23.0  --  18.0*   < > 20.0*   < > 24.0  --  24.0 22.0   *   < > 123*  --  120*   < > 124*   < > 120*  --  105* 171*   MG 1.8   < >  -

## 2021-06-19 NOTE — PROGRESS NOTES
[facility] - [facility]  Nephrology Daily Progress Note    Gregory Flores  [id number]  80year old      HPI:   Gregory Flores is a 80year old female. Had epidural and back pain seems to be improving. Otherwise no new C/>  No SOB.        ROS: and motor skills appropriate for age    Labs:  Lab Results   Component Value Date    WBC 14.8 06/19/2021    HGB 11.1 06/19/2021    HCT 32.8 06/19/2021    .0 06/19/2021    CREATSERUM 1.27 06/19/2021    BUN 52 06/19/2021     06/19/2021    K 4.8 powder packet 17 g, 17 g, Oral, Daily PRN  •  magnesium hydroxide (MILK OF MAGNESIA) 400 MG/5ML suspension 30 mL, 30 mL, Oral, Daily PRN  •  dilTIAZem BOLUS FROM BAG 10 mg infusion, 10 mg, Intravenous, Q1H PRN  •  amiodarone HCl (PACERONE) tab 200 mg, 200 CPM.  Labs in .              6/19/2021  Lachelle Fuentes MD

## 2021-06-20 NOTE — PLAN OF CARE
Patient alert. Reports sore throat pain. Prn cepacol lozenge given as requested. Pain is relieved, but pt reports difficulty swallowing afterwards. Sinus blanca on tele. Up with assistance with walker.   Pt calls appropriately for assistance with ambula Continuous cardiac monitoring, monitor vital signs, obtain 12 lead EKG if indicated  - Evaluate effectiveness of antiarrhythmic and heart rate control medications as ordered  - Initiate emergency measures for life threatening arrhythmias  - Monitor electro

## 2021-06-20 NOTE — PROGRESS NOTES
[facility]  Nephrology Daily Progress Note    Traci Hubbard  [id number]  80year old      HPI:   Traci Hubbard is a 80year old female. Overall feeling well. Anxious to go home. LBP much better.         ROS:     Constitutional: reflexes and motor skills appropriate for age    Labs:  Lab Results   Component Value Date    WBC 15.6 06/20/2021    HGB 11.4 06/20/2021    HCT 34.3 06/20/2021    .0 06/20/2021    CREATSERUM 1.24 06/20/2021    BUN 51 06/20/2021     06/20/2021 Daily  •  docusate sodium (COLACE) cap 100 mg, 100 mg, Oral, BID  •  PEG 3350 (MIRALAX) powder packet 17 g, 17 g, Oral, Daily PRN  •  magnesium hydroxide (MILK OF MAGNESIA) 400 MG/5ML suspension 30 mL, 30 mL, Oral, Daily PRN  •  dilTIAZem BOLUS FROM BAG 10 extremity edema      UO not accurate but creatinine stable 1.24. Sodium 133. Continue fluid restriction. Possible discharge tomorrow. Remove ramírez. Bladder scan prn decrease UO.   Discussed with RN and Dr. Jacky Whittington.              6/20/2021  Angella Lainez MD

## 2021-06-20 NOTE — CHRONIC PAIN
[facility] - [facility]  Inpatient Pain Management Progress Note      Patient name: Thais Kc 80year old female  : 1928  MRN: [id number]    Diagnosis: Hyponatremia  (primary encounter diagnosis)  Metabolic acidosis  Intractable low ba acetaminophen **OR** HYDROcodone-acetaminophen **OR** HYDROcodone-acetaminophen, famoTIDine    Exam:Blood pressure (!) 170/51, pulse 60, temperature 98.1 °F (36.7 °C), temperature source Oral, resp.  rate 18, height 5' 2\" (1.575 m), weight 131 lb 1.6 oz (5

## 2021-06-20 NOTE — PROGRESS NOTES
[facility] - [facility]  Progress Note     Miguel Ewing  : 1928    Status: Inpatient  Day #: 10    Attending: Chayo Cesar MD  PCP: Susannah Grimaldo MD      Assessment and Plan     Hypoosmolar hyponatremia  Mild metabolic acidosis  Dehydrati stable     Subjective     Breathing ok. No CP. Back pain much better.       Objective     Temp:  [97.5 °F (36.4 °C)-98.2 °F (36.8 °C)] 98.2 °F (36.8 °C)  Pulse:  [58-68] 68  Resp:  [16-18] 18  BP: (114-167)/(48-67) 114/48  General:  Alert, no distress  HEEN 8. 3* 8.4*   ALB  --    < > 2.9*  --   --   --   --   --   --  3.0*  --  3.1*   *   < > 130*   < > 135*   < > 133*   < >  --  132* 131* 133*   K 4.8   < > 4.6   < > 4.9   < > 4.9   < >  --  5.1 4.8 5.0      < > 105   < > 106   < > 101   < >  -- [MAR Hold] morphINE sulfate, acetaminophen **OR** HYDROcodone-acetaminophen **OR** HYDROcodone-acetaminophen, famoTIDine      Spent >35 minutes, >50% of the time counseling coordinating care. Discussed plan of care.      Jose Bates MD

## 2021-06-21 NOTE — PROGRESS NOTES
[facility]    Progress Note      Subjective:     Having BF this am - no sob or cp.     scheduled for epidural today     Review of Systems:     Constitutional: negative for fatigue, fevers and weight loss  Eyes: negative for irritation, re 3350 (MIRALAX) powder packet 17 g, 17 g, Oral, Daily PRN  magnesium hydroxide (MILK OF MAGNESIA) 400 MG/5ML suspension 30 mL, 30 mL, Oral, Daily PRN  dilTIAZem BOLUS FROM BAG 10 mg infusion, 10 mg, Intravenous, Q1H PRN  amiodarone HCl (PACERONE) tab 200 mg BUN 53*   < > 52* 51* 51*   CREATSERUM 1.19*   < > 1.27* 1.24* 1.18*   GFRAA 46*   < > 42* 44* 46*   GFRNAA 40*   < > 37* 38* 40*   CA 8.6   < > 8.3* 8.4* 7.9*   ALB 3.0*  --   --  3.1* 2.9*   *   < > 131* 133* 130*   K 5.1   < > 4.8 5.0 4.8   CL 1

## 2021-06-21 NOTE — DISCHARGE PLANNING
Patient's daughter Silvia Nair was provided with discharge instructions, education, and follow up information by telephone. Printed prescription for carvedilol was called in to patient's pharmacy [address].  Prescriptions were already sent electronically to [facility]

## 2021-06-21 NOTE — CM/SW NOTE
Received MDO for HH orders w/ BMP in 3-5 days. OSIEL messaged [place] w/ [facility] and informed her of pt's intended DC today. UPDATE at 01:40PM: Pt's family requesting [facility] transport home w/ assistance into the home.     OSIEL spoke to [place] w/ [address]

## 2021-06-21 NOTE — PLAN OF CARE
Pt alert. Reports no pain. Stable on Tele. Call light within easy reach. Calls appropriately for assistance with ambulation. Pt urinating freely. She is incontinent of urine, soaking her depends. No signs of urinary retention noted. VSS.   No acute analgesics based on type and severity of pain and evaluate response  - Implement non-pharmacological measures as appropriate and evaluate response  - Consider cultural and social influences on pain and pain management  - Manage/alleviate anxiety  - Utilize

## 2021-06-21 NOTE — DISCHARGE PLANNING
I called the pharmacy below to inquire about the copay of Eliquis before patient discharges. [place] [address]  Yakov Miranda. [phone]    The patient's copay is $20 for 30 day supply but the pharmacy does not h

## 2021-06-21 NOTE — DISCHARGE SUMMARY
[facility]    Discharge Summary    Lisa Mullins Patient Status:  Inpatient    1928 MRN [id number]   Location [facility] 3W/SW Attending Lani Simpson MD   [facility] Day [facility] PCP Josue Garcia MD     Date of Admission: 6/10/ Ultrasound Doppler of both lower extremities showed no evidence of DVT. She was started on IV Solu-Medrol in the emergency room. She will be admitted to the hospital for further management.     Hospital Course:   Hypoosmolar hyponatremia  Mild metabolic a stopped     Vitals: Blood pressure 148/61, pulse 69, temperature 98.2 °F (36.8 °C), temperature source Oral, resp. rate 16, height 5' 2\" (1.575 m), weight 126 lb 14.4 oz (57.6 kg), SpO2 99 %. Follow up:    [address] [place]      Take 10 mg by mouth nightly. Every  Other day, skip 6/10/2021   Refills: 0     famoTIDine 20 MG Tabs  Commonly known as: Pepcid      Take 1 tablet (20 mg total) by mouth 2 (two) times daily as needed for Heartburn.    Stop taking on: July 2, 202 prescriptions at the location directed by your doctor or nurse    Bring a paper prescription for each of these medications  · amiodarone HCl 200 MG Tabs  · carvedilol 12.5 MG Tabs           Discharge Instructions       Sometimes managing your health at [facility]

## 2021-06-21 NOTE — PLAN OF CARE
Problem: Patient Centered Care  Goal: Patient preferences are identified and integrated in the patient's plan of care  Description: Interventions:  - What would you like us to know as we care for you?  From home alone   - Provide timely, complete, and acc level and precautions  - Recommend use of  RW for transfers and ambulation  Outcome: Adequate for Discharge     Problem: CARDIOVASCULAR - ADULT  Goal: Maintains optimal cardiac output and hemodynamic stability  Description: INTERVENTIONS:  - Monitor vital FOR INFECTION - ADULT  Goal: Absence of fever/infection during anticipated neutropenic period  Description: INTERVENTIONS  - Monitor WBC  - Administer growth factors as ordered  - Implement neutropenic guidelines  Outcome: Adequate for Discharge     Proble unit.

## 2021-06-21 NOTE — PHYSICAL THERAPY NOTE
PHYSICAL THERAPY TREATMENT NOTE - INPATIENT     Room Number: 333/333-A       Presenting Problem: Hyponatremia, metabolic acidosis, dehydration    Problem List  Principal Problem:    Hyponatremia  Active Problems:    Metabolic acidosis    Intractable low ba AM-PAC '6-Clicks' INPATIENT SHORT FORM - BASIC MOBILITY  How much difficulty does the patient currently have. ..  -   Turning over in bed (including adjusting bedclothes, sheets and blankets)?: A Little   -   Sitting down on and standing up from a chair in preparation for discharge.    Goal #5   Current Status  in progress   Goal #6     Goal #6  Current Status

## 2021-06-22 NOTE — PAYOR COMM NOTE
Discharge Notification    Patient Name: Chriss Bowman [employer]CELL[employer] PPO  Subscriber #: [id number]  Authorization Number: [id number]  Admit Date/Time: 6/10/2021 11:18 AM  Discharge Date/Time: 6/21/2021 5:10 PM

## 2021-07-01 NOTE — CM/SW NOTE
Received a call from patient’s daughter Nicole Green regarding her mother’s need for rehab. Nena states that the patient has become weaker since she discharged home from the hospital on 6/21/21.  Nena states that the patient had therapy at [facility]

## 2021-07-08 NOTE — PROGRESS NOTES
pt seen 7/2/21 at [facility]    seen in room, no distress     HPI:  The patient is a 66-year-old  female who was seen in the emergency room around 1 week ago for bilateral lower extremity edema. She was given a prescription of Lasix and discharged home.

## 2021-07-08 NOTE — PROGRESS NOTES
Pt seen 7/8/21 at [facility] [place]    Seen in room , no distress     treated for UTI, will get ID on board    latest labs and vitals noted     subj:  + gen weakness  no cp  no sob  no abd pain    vit : stable     obj:   cv s1 s2  chest clear  abd soft  ext : from

## 2021-07-08 NOTE — PROGRESS NOTES
Pt seen 7/6/21 at [facility] [place]    Seen in room , no distress     Cr had gone up a little over the weekend IVF 1 L given, repeat labs with improved cr    subj:  + gen weakness  no cp  no sob  no abd pain    vit : stable     obj:   cv s1 s2  chest clear  abd soft

## 2021-07-19 NOTE — PROGRESS NOTES
Pt seen 7/12/21 at [facility] [place]    Seen in room , no distress       feels week, continue with pt     latest labs and vitals noted     subj:  + gen weakness  no cp  no sob  no abd pain    vit : stable     obj:   cv s1 s2  chest clear  abd soft  ext : from

## 2021-07-19 NOTE — PROGRESS NOTES
Pt seen 7/16 /21 at [facility] [place]    Seen in room , no distress , continues to feel weak      continue with pt , latest labs and vitals noted     nursing noted     subj:  + gen weakness  no cp  no sob  no abd pain    vit : stable     obj:   cv s1 s2  chest clear

## 2021-07-20 NOTE — PROGRESS NOTES
Pt arrived via EMS from [facility] having experienced c. 5 min unresponsiveness and weakness. No family present. Will continue to monitor.     07/20/21 8629   Clinical Encounter Type   Visited With Patient not available   Crisis Visit ED

## 2021-07-20 NOTE — ED QUICK NOTES
Orders for admission, patient is aware of plan and ready to go upstairs. Any questions, please call ED RN Melinda Mclaughlin  at extension [phone].     Type of COVID test sent: rapid    COVID Suspicion level: Low         Titratable drug(s) infusing: n.a    Rate:

## 2021-07-20 NOTE — ED PROVIDER NOTES
Patient Seen in: [facility] AND [facility] Emergency Department      History   Patient presents with:  Stroke    Stated Complaint: 'stroke'    HPI/Subjective:   HPI  80 yoF with history of hyponatremia, atrial fibrillation on apixaban, diastolic dysfunction, mil Not on file    Drug use: Not on file             Review of Systems    Positive for stated complaint: 'stroke'  Other systems are as noted in HPI. Constitutional and vital signs reviewed. All other systems reviewed and negative except as noted above. extremity and left upper extremity strength 5 out of 5. Sensation intact to these areas. NIHSS is 3 however these are not new deficits.          Differential includes ICH, TIA, CVA, metabolic derangement, UTI    ED Course     Labs Reviewed   URINALYSIS WI components within normal limits   TROPONIN I - Normal   LACTIC ACID, PLASMA - Normal   RAPID SARS-COV-2 BY PCR - Normal   CBC WITH DIFFERENTIAL WITH PLATELET    Narrative:      The following orders were created for panel order CBC With Differential With Eb infarction. 2. Moderate to severe presumed sequelae of chronic microangiopathy throughout both cerebral hemispheres. 3. Generalized cerebral volume loss. 4. Lesser incidental findings as above.    Dictated by (CST): Rivera Whitmore MD on 7/20/2021 at 5:09 P including reassessment of your blood pressure.       Medications Prescribed:  Current Discharge Medication List                          Hospital Problems     Present on Admission  Date Reviewed: 11/20/2019        ICD-10-CM Noted POA    * (Principal) Hypona

## 2021-07-20 NOTE — ED INITIAL ASSESSMENT (HPI)
Pt arrives via ems from [facility] for 'celia' per ems. Ems called a stroke alert in the field for alleged new onset HA w hx of TIA. pta only concern right now is shes cold.

## 2021-07-20 NOTE — CONSULTS
[facility] [address]  [address]  [phone]          [facility]    Report of Consultation    Paulo Spatz Patient Status:  Emergency     1928 MR hemiparesis. Patient denies having any right-sided weakness. Evaluation of the emergency department she was noted to have left-sided weakness which she reports is chronic.     History provided by the patient and her granddaughter is more concerning for he Date   • CVA (cerebral vascular accident) [facility])    • Disease of vein    • Essential hypertension    • Neurological disease    • Neuropathy    • Onychia, toe 2010    per NG: onychia hallux rt/pain; complete avulsion/debrided nailbed area   • Stroke ([facility] [address] Current Outpatient Medications:   •  valsartan 320 MG Oral Tab, Take 1 tablet (320 mg total) by mouth daily. , Disp: 30 tablet, Rfl: 0  •  HYDROcodone-acetaminophen 5-325 MG Oral Tab, Take 1 tablet by mouth every 4 (four) hours as needed. , Disp: 30 tablet repeat testing all visual fields are intact. Pupils are 3mm briskly constricting to 2mm and equally round and reactive to light  in a well lit room. EOMI. No nystagmus. B/L  ptosis. V1-V3 intact B/L to light touch. No pathological facial asymmetry.  No flat d/t neuropathy    9. Best Language:  0   10. Dysarthria: 0   11.  Extinction and Inattention: 0    Total:   2         Modified [place] Score: 3                              Data reviewed    ECG findings by monitor or 12-lead:  Ecg: No results found for this If the patient had transient hypoperfusion of her left superior division MCA she may have had transient right-sided weakness when EMS evaluated her.    Per discussion with Dr. Dino Middleton the biggest concern is adrenal insufficiency due to steroid withdrawal, wh did indeed have right-sided weakness. 3. BP goal:   a. Avoid hypotension. MAP greater than 65.   Systolic blood pressure greater than 90.  4. Additional brain and vascular imaging ordered:   a. Consider CTA as an outpatient when her renal function improve Verbalized understanding and Demonstrated understanding    This document is not intended to support charting by exception. Sections left blank in a completed note should be presumed not to have been done.     Level of complexity was based on - interviewing

## 2021-07-20 NOTE — H&P
[facility]    PATIENT'S NAME: Amie Polk   ATTENDING PHYSICIAN: Mallika Amor MD   PATIENT ACCOUNT#:   [de-identified]    LOCATION:  [place] [address]  MEDICAL RECORD #:   [id number]       YOB: 1928  ADMISSION DATE:       07/2 rehab.  She has not been able to do much with rehab because of her symptoms. REVIEW OF SYSTEMS:  The patient said for the last several weeks she has been feeling progressively sluggish, fatigued, tired. Lightheaded when she stands up and headache.   She dexamethasone 6 mg in the emergency room which will not interfere with ACTH stimulation test, place her on fall precautions. Further recommendations to follow.     Dictated By Zeyad Caldera MD  d: 07/20/2021 15:30:01  t: 07/20/2021 15:33:15  [facility] [id number]/

## 2021-07-21 NOTE — PAYOR COMM NOTE
--------------  ADMISSION REVIEW     Payor: [employer] PPO  Subscriber #:  [id number]  Authorization Number: [id number]       ED Provider Notes        History   Patient presents with:  Stroke    Stated Complaint: 'stroke'    HPI/Subjective:   HPI  5 stated complaint: 'stroke'  Other systems are as noted in HPI. Constitutional and vital signs reviewed. All other systems reviewed and negative except as noted above.     Physical Exam     ED Triage Vitals [07/20/21 1237]   /87   Pulse 77   Resp NIHSS is 3 however these are not new deficits.          Differential includes ICH, TIA, CVA, metabolic derangement, UTI    ED Course     Labs Reviewed   URINALYSIS WITH CULTURE REFLEX - Abnormal; Notable for the following components:       Result Value    C SARS-COV-2 BY PCR - Normal   CBC WITH DIFFERENTIAL WITH PLATELET    Narrative:      EKG    Rate, intervals and axes as noted on EKG Report.   Rate: 71  Rhythm: Sinus Rhythm  Reading: No STEMI, artifact V6        XR CHEST AP PORTABLE  (CPT=71045)    Result D tPA candidate due to unknown onset of symptoms and no new deficits at this time. Urinalysis reviewed and will be sent for culture. She is currently still taking antibiotics in the nursing home for UTI. Lactate negative.     Laboratory studies concerni lumbar spine was done which showed degenerative joint disease of lumbar spine and had an epidural injection plus Solu-Medrol. The patient was also found to have new-onset atrial fibrillation and started on Eliquis.   Echocardiogram showed left ventricular aortic stenosis, right lumbar radiculopathy and spinal stenosis s/p SELIN on 6/18, and a prior history of putative?stroke/TIA who presents for questionable respiratory distress and possible transient right-sided weakness per EMS, concerning for stroke.   The of first antithrombotic administration (or contraindication):   a. Continue anticoagulation with direct oral anticoagulant, apixaban 2.5 twice daily renally dosed. Ideally, would be able to speak to EMS about  Her right-sided weakness.   If indeed she was If she did have right-sided weakness then the culprit is her intracranial atherosclerosis and the treatment is to add an antiplatelet.   15. ATORVAStatin; she should be on a statin regardless given her prior putative history of TIA or stroke.     Headaches 28* 41*   GFRNAA 24* 36*   CA 8.3* 8.0*   * 133*   K 5.3* 4.5   CL 98 105   CO2 19.0* 17.0*           Urine Culture, Routine  Order: [id number]  Collected:  7/20/2021 14:39 Status:  Preliminary result  Specimen Information: Urine, clean catch

## 2021-07-21 NOTE — PLAN OF CARE
Pt admitted to floor prior to shift with family at bedside. Aox4. Neuro checks Q4h. Started on IVF. Fall precautions maintained. Vitals currently stable. Will continue to monitor.      Problem: Patient Centered Care  Goal: Patient preferences are identified decreased coronary artery perfusion - ex.  Angina  - Evaluate fluid balance, assess for edema, trend weights  Outcome: Progressing     Problem: MUSCULOSKELETAL - ADULT  Goal: Return mobility to safest level of function  Description: INTERVENTIONS:  - Assess

## 2021-07-21 NOTE — CONSULTS
[facility]    Report of Consultation    Patti Crawford Patient Status:  Inpatient    1928 MRN [id number]   Location [facility] 5SW/SE Attending Ivana Collins MD   Hosp Day # 1 PCP Tawanna Alexis MD     Date of Adm PRN  •  aspirin tab 325 mg, 325 mg, Oral, Once  •  atorvastatin (LIPITOR) tab 40 mg, 40 mg, Oral, Nightly  •  amiodarone HCl (PACERONE) tab 200 mg, 200 mg, Oral, Daily  •  Amitriptyline HCl (ELAVIL) tab 10 mg, 10 mg, Oral, Nightly  •  apixaban (ELIQUIS) ta

## 2021-07-21 NOTE — PROGRESS NOTES
[place] [facility] - [facility]    Progress Note    Marla Fred Patient Status:  Inpatient    1928 MRN [id number]   Location [facility] 5SW/SE Attending Verna Minaya MD   Hosp Day # 1 PCP Jorge Harris MD       Subjective:   Cat (COREG) tab 12.5 mg, 12.5 mg, Oral, BID with meals  •  famoTIDine (PEPCID) tab 20 mg, 20 mg, Oral, Daily PRN  •  ferrous sulfate EC tab 325 mg, 325 mg, Oral, Daily with breakfast  •  gabapentin (NEURONTIN) cap 300 mg, 300 mg, Oral, Nightly  •  HYDROcodone- thready flow with moderate multifocal stenosis. This finding likely relates to vertebral artery atherosclerosis. Variant diminutive distal right vertebral artery, which largely terminates in the right posterior inferior cerebellar artery.  2. Moderate foc acute or early subacute infarction. 2. Moderate to severe presumed sequelae of chronic microangiopathy throughout both cerebral hemispheres. 3. Generalized cerebral volume loss. 4. Lesser incidental findings as above.    Dictated by (CST): JOSI Bello

## 2021-07-21 NOTE — DIETARY NOTE
ADULT NUTRITION INITIAL ASSESSMENT    RECOMMENDATIONS TO MD:  See Nutrition Intervention    Pt is at moderate nutrition risk. Deferred  malnutrition criteria (Pt not available for physical exam). ADMITTING DIAGNOSIS: Hyponatremia [E87. 1]Metabolic ac reviewed Iron sucrose, Iron sulfate, others noted. • sodium bicarbonate infusion 75 mEq (07/21/21 1337)     LABS: reviewed  Labs consistent with met acidosis.  ISMAEL, and Hyponatremia  Recent Labs     07/20/21  1300 07/21/21  0649 07/22/21  0657   * PLAN/INTERVENTION:    NUTRITION PRESCRIPTION:   Estimated Nutrition needs: --dosing wt of 57 kg   Calories: 8464-0338 calories/day (25-28 calories per kg Actual body wt (ABW))    Protein: 68 g protein/day (1.2 g protein/kg  Actual body wt (ABW))      - Die

## 2021-07-21 NOTE — CM/SW NOTE
DAKOTAH Tripp from [facility] calling wanting to know patient's admitting DX - informed DAKOTAH Tripp pt's admitting DX was hyponatremia and metabolic acidosis.  DAKOTAH Tripp v/u.

## 2021-07-21 NOTE — CM/SW NOTE
Cm lvm for rehab supv requesting early PT eval and note in order to start ins auth for return to [facility].      7/22 0830  CM rec'd msg from OT that dtr would like to consider pt going to [address] at [facility] and not back to [facility].     CM sent ref to [facility] for considerat

## 2021-07-21 NOTE — CONSULTS
[facility]    PATIENT'S NAME: Karen Granegr   ATTENDING PHYSICIAN: Mayte Hurst MD   CONSULTING PHYSICIAN: Linus De La Torre MD   PATIENT ACCOUNT#:   [id number]    LOCATION:  [address] [place] [address] #:   [id number]       DATE OF drugs.    SOCIAL HISTORY:  Nonsmoker. Previously was living with her family up until this recent nursing home admission. FAMILY HISTORY:  Negative for renal pathology. REVIEW OF SYSTEMS:  The patient states she just feels somewhat anorectic.   No zoe cerebrovascular accident. 4.   Degenerative joint disease. 5.   Chronic kidney disease stage 3.  6.   Chronic low back pain. 7.   Anemia with iron deficiency. Rule out GI blood loss. 8.   History of paroxysmal atrial fibrillation.   Her last echocardio

## 2021-07-21 NOTE — OCCUPATIONAL THERAPY NOTE
OCCUPATIONAL THERAPY EVALUATION - INPATIENT     Room Number: 591I/779-B  Evaluation Date: 7/21/2021  Type of Evaluation: Initial       Physician Order: IP Consult to Occupational Therapy  Reason for Therapy: ADL/IADL Dysfunction and Discharge Planning    O RECOMMENDATIONS  OT Discharge Recommendations: Sub-acute rehabilitation  OT Device Recommendations: TBD    PLAN  OT Treatment Plan: Balance activities; Energy conservation/work simplification techniques;ADL training;Functional transfer training;Patient/Fami urinal? : A Lot  -   Putting on and taking off regular upper body clothing?: A Lot  -   Taking care of personal grooming such as brushing teeth?: A Little  -   Eating meals?: A Little    AM-PAC Score:  Score: 14  Approx Degree of Impairment: 59.67%  Argelia De Souza

## 2021-07-21 NOTE — PLAN OF CARE
Patient has no complaints throughout the day. Patient being seen by nephrology now and ultrasound of kidney completed. Patient given IV iron today. Patient cleaned up. Neuro checks every four hours. Cortisol test completed this morning.  OB sample unable t Assess quality of pulses, skin color and temperature  - Assess for signs of decreased coronary artery perfusion - ex.  Angina  - Evaluate fluid balance, assess for edema, trend weights  Outcome: Progressing     Problem: METABOLIC/FLUID AND ELECTROLYTES - AD Achieves stable or improved neurological status  Description: INTERVENTIONS  - Assess for and report changes in neurological status  - Initiate measures to prevent increased intracranial pressure  - Maintain blood pressure and fluid volume within ordered p

## 2021-07-21 NOTE — CM/SW NOTE
SW initiated self referral for discharge planning. Patient admitted from [facility]. Updates sent via Aidin. Patient will need PT/OT, insurance authorization for patient to return for [facility]. RN informed during rounds.     Plan: [facility]

## 2021-07-22 NOTE — PLAN OF CARE
Problem: Patient Centered Care  Goal: Patient preferences are identified and integrated in the patient's plan of care  Description: Interventions:  - What would you like us to know as we care for you?   - Provide timely, complete, and accurate informatio INTERVENTIONS:  - Monitor labs and rhythm and assess patient for signs and symptoms of electrolyte imbalances  - Administer electrolyte replacement as ordered  - Monitor response to electrolyte replacements, including rhythm and repeat lab results as appro hemorrhage  - Monitor temperature, glucose, and sodium.  Initiate appropriate interventions as ordered  Outcome: Progressing   Patient with no complaints throughout the night, stool still needed for OB, vital signs stable, neuro checks q4 hours, fall precau

## 2021-07-22 NOTE — PLAN OF CARE
Problem: Patient Centered Care  Goal: Patient preferences are identified and integrated in the patient's plan of care  Description: Interventions:  - What would you like us to know as we care for you?   - Provide timely, complete, and accurate informatio Assist with transfers and ambulation using safe patient handling equipment as needed  - Ensure adequate protection for wounds/incisions during mobilization  - Obtain PT/OT consults as needed  - Advance activity as appropriate  - Communicate ordered activit

## 2021-07-22 NOTE — PROGRESS NOTES
Endocrine Note    Reviewed stimulation testing from yesterday which was normal.  Hyponatremia has resolved and likely related to medications and poor PO intake per Dr. Vaishnavi Harmon. No evidence of adrenal insufficiency. Endocrine service will sign off.

## 2021-07-22 NOTE — PROGRESS NOTES
[facility]    Progress Note    Tamanna Mcneil Patient Status:  Inpatient    1928 MRN [id number]   Location [facility] 5SW/SE Attending Hussein Romero MD   Hosp Day # 2 PCP Jaya Gibbons MD       Subjective:   Cat Nightly  •  apixaban (ELIQUIS) tab 2.5 mg, 2.5 mg, Oral, BID  •  carvedilol (COREG) tab 12.5 mg, 12.5 mg, Oral, BID with meals  •  famoTIDine (PEPCID) tab 20 mg, 20 mg, Oral, Daily PRN  •  ferrous sulfate EC tab 325 mg, 325 mg, Oral, Daily with breakfast 7/20/2021  CONCLUSION:  1. Intracranial/V4 segment of the left vertebral artery demonstrates thready flow with moderate multifocal stenosis. This finding likely relates to vertebral artery atherosclerosis.   Variant diminutive distal right vertebral artery

## 2021-07-22 NOTE — PHYSICAL THERAPY NOTE
PHYSICAL THERAPY EVALUATION - INPATIENT     Room Number: 621V/015-T  Evaluation Date: 7/22/2021  Type of Evaluation: Initial   Physician Order: PT Eval and Treat    Presenting Problem: hyponatremia, hyperkalemia, weakness  Reason for Therapy: Mobility Dys alarm on, RN notified of pt's functional mobility. Recommended to RN to use one assist.    Patient's current functional deficits include decreased strength, activity tolerance, balance, gait, transfers, which are below the patient's pre-admission status. hospital for further management. \"     Problem List  Principal Problem:    Hyponatremia  Active Problems:    Metabolic acidosis      Past Medical History  Past Medical History:   Diagnosis Date   • CVA (cerebral vascular accident) ([address]    • Disease of vei side of the bed?: A Little   How much help from another person does the patient currently need. ..   -   Moving to and from a bed to a chair (including a wheelchair)?: A Little   -   Need to walk in hospital room?: A Lot   -   Climbing 3-5 steps with a rail

## 2021-07-22 NOTE — PROGRESS NOTES
[facility]    Progress Note      Subjective:     State she just woke up and slept well overnight     No sob or cp.  External cathter with clear urine       Review of Systems:     Constitutional: negative for fever  Eyes: negative for irrit mg, Oral, Q6H PRN  ondansetron HCl (ZOFRAN) injection 4 mg, 4 mg, Intravenous, Q6H PRN  Metoclopramide HCl (REGLAN) injection 5 mg, 5 mg, Intravenous, Q8H PRN  aspirin tab 325 mg, 325 mg, Oral, Once  atorvastatin (LIPITOR) tab 40 mg, 40 mg, Oral, Nightly hours. Recent Labs   Lab 07/22/21  0657   MG 1.7   PHOS 3.2        Recent Labs   Lab 07/22/21  0657   PHOS 3.2   ALB 1.8*       MRA BRAIN (JUZ=[id number])    Result Date: 7/20/2021  CONCLUSION:  1.  Intracranial/V4 segment of the left vertebral artery demonstra 7/20/2021  CONCLUSION:   Moderate generalized cerebral atrophy and chronic ischemic white matter changes. No CT evidence for acute intracranial process.   This report was called immediately at 1257 hours to ER POD 4 @50087 and discussed with Dr. America Meraz pending - Endo on board   - furosemide on hold - will assess to resume in am     BP stable - lowish - monitor     Discussed with Nursing and Dr Roland Malhotra MD  7/22/2021

## 2021-07-23 NOTE — PROGRESS NOTES
Pt seen 7/19 /21 at [facility]    Seen in room continues to feel weak      continue with pt ,     latest labs and vitals noted     nursing notes  noted     subj:  + gen weakness  no cp  no sob  no abd pain    vit : stable     obj:   cv s1 s2  chest clear  abd s

## 2021-07-23 NOTE — PROGRESS NOTES
[facility]  Nephrology Daily Progress Note    Lisa Mullins  [id number]  80year old      HPI:   Lisa Mullins is a 80year old female. Tired and sleepy but more awake and alert this am as per RN.        ROS:     Constitutional: age    Labs:  Lab Results   Component Value Date    WBC 9.0 07/23/2021    HGB 7.9 07/23/2021    HCT 24.8 07/23/2021    .0 07/23/2021    CREATSERUM 1.48 07/23/2021    BUN 47 07/23/2021     07/23/2021    K 4.6 07/23/2021     07/23/2021 PM          XR CHEST AP PORTABLE  (CPT=71045)    Result Date: 7/22/2021  CONCLUSION:  1. Little change from July 20, 2021. 2. Bilateral ill-defined areas of opacification/infiltration. 3. Cardiomegaly. 4. Hiatal hernia. Dictated by (CST):  Jordan Isaac cap/tab 5 mg, 5 mg, Oral, Nightly PRN  •  cefTRIAXone Sodium (ROCEPHIN) 1 g in sodium chloride 0.9% 100 mL IVPB-ADDV, 1 g, Intravenous, Q24H  •  iron sucrose (VENOFER) IV Push 200 mg, 200 mg, Intravenous, Daily  •  acetaminophen (TYLENOL) tab 650 mg, 650 m

## 2021-07-23 NOTE — PROGRESS NOTES
[facility]    Progress Note    Karissa Jurist Patient Status:  Inpatient    1928 MRN [id number]   Location [facility] 5SW/SE Attending Kenneth Bird MD   Hosp Day # 3 PCP Steven Bobo MD       Subjective:   Cat mg, Oral, Nightly  •  amiodarone HCl (PACERONE) tab 200 mg, 200 mg, Oral, Daily  •  Amitriptyline HCl (ELAVIL) tab 10 mg, 10 mg, Oral, Nightly  •  apixaban (ELIQUIS) tab 2.5 mg, 2.5 mg, Oral, BID  •  carvedilol (COREG) tab 12.5 mg, 12.5 mg, Oral, BID with 07/22/21  0657 07/23/21  0729   * 121* 87   BUN 43* 44* 47*   CREATSERUM 1.29* 1.27* 1.48*   GFRAA 41* 42* 35*   GFRNAA 36* 36* 30*   CA 8.0* 8.5 8.8   * 136 131*   K 4.5 4.3 4.6    107 103   CO2 17.0* 20.0* 20.0*         Imaging:   XR C

## 2021-07-23 NOTE — PLAN OF CARE
Problem: Patient Centered Care  Goal: Patient preferences are identified and integrated in the patient's plan of care  Description: Interventions:  - What would you like us to know as we care for you?   - Provide timely, complete, and accurate informatio limits  Description: INTERVENTIONS:  - Monitor labs and rhythm and assess patient for signs and symptoms of electrolyte imbalances  - Administer electrolyte replacement as ordered  - Monitor response to electrolyte replacements, including rhythm and repeat and minimize risk of hemorrhage  - Monitor temperature, glucose, and sodium. Initiate appropriate interventions as ordered  Outcome: Not Progressing   Patient informed on plan of care and all questions answered to the best of my ability.  Call light within

## 2021-07-23 NOTE — PLAN OF CARE
Problem: Patient Centered Care  Goal: Patient preferences are identified and integrated in the patient's plan of care  Description: Interventions:  - What would you like us to know as we care for you?  - Provide timely, complete, and accurate information INTERVENTIONS:  - Monitor labs and rhythm and assess patient for signs and symptoms of electrolyte imbalances  - Administer electrolyte replacement as ordered  - Monitor response to electrolyte replacements, including rhythm and repeat lab results as appro hemorrhage  - Monitor temperature, glucose, and sodium. Initiate appropriate interventions as ordered  Outcome: Progressing     Prn Norco given for pain. Prn Melatonin given for sleep. Neuro checks q4hrs.  Call light within reach, bed in lowest position, al

## 2021-07-24 NOTE — PROGRESS NOTES
[facility]    Progress Note      Subjective:     C/o left shoulder pain - no sob or cp     Review of Systems:     Constitutional: negative for fever  Eyes: negative for irritation, redness and visual disturbance  Respiratory: negative for mg, Oral, Q6H PRN  ondansetron HCl (ZOFRAN) injection 4 mg, 4 mg, Intravenous, Q6H PRN  Metoclopramide HCl (REGLAN) injection 5 mg, 5 mg, Intravenous, Q8H PRN  aspirin tab 325 mg, 325 mg, Oral, Once  atorvastatin (LIPITOR) tab 40 mg, 40 mg, Oral, Nightly hours. Recent Labs   Lab 07/22/21  0657 07/23/21  0729 07/24/21 0719   MG 1.7 1.5* 2.6   PHOS 3.2 3.1 2.9        Recent Labs   Lab 07/22/21  0657 07/23/21  0729 07/24/21 0719   PHOS 3.2 3.1 2.9   ALB 1.8* 1.9* 1.7*       No results found.         Assessm

## 2021-07-24 NOTE — PLAN OF CARE
Problem: Patient Centered Care  Goal: Patient preferences are identified and integrated in the patient's plan of care  Description: Interventions:  - What would you like us to know as we care for you?  I'm doing better  - Provide timely, complete, and acc Electrolytes maintained within normal limits  Description: INTERVENTIONS:  - Monitor labs and rhythm and assess patient for signs and symptoms of electrolyte imbalances  - Administer electrolyte replacement as ordered  - Monitor response to electrolyte rep awareness active.       Problem: NEUROLOGICAL - ADULT  Goal: Achieves stable or improved neurological status  Description: INTERVENTIONS  - Assess for and report changes in neurological status  - Initiate measures to prevent increased intracranial pressure

## 2021-07-24 NOTE — PLAN OF CARE
Patient stable, needs attended to. PRN suppository given today, no BM noted yet. Family at bedside through out the day. POC, medications and safety discussed with patient.      Problem: Patient Centered Care  Goal: Patient preferences are identified and int Angina  - Evaluate fluid balance, assess for edema, trend weights  Outcome: Progressing     Problem: METABOLIC/FLUID AND ELECTROLYTES - ADULT  Goal: Electrolytes maintained within normal limits  Description: INTERVENTIONS:  - Monitor labs and rhythm and as neurological status  - Initiate measures to prevent increased intracranial pressure  - Maintain blood pressure and fluid volume within ordered parameters to optimize cerebral perfusion and minimize risk of hemorrhage  - Monitor temperature, glucose, and so

## 2021-07-24 NOTE — PROGRESS NOTES
[facility]    Progress Note    Tania Umana Patient Status:  Inpatient    1928 MRN [id number]   Location [facility] 5SW/SE Attending Mauricio Eaton MD   Hosp Day # 4 PCP Bulmaro Olson MD       Subjective:   Cat mg, 40 mg, Oral, Nightly  •  amiodarone HCl (PACERONE) tab 200 mg, 200 mg, Oral, Daily  •  Amitriptyline HCl (ELAVIL) tab 10 mg, 10 mg, Oral, Nightly  •  apixaban (ELIQUIS) tab 2.5 mg, 2.5 mg, Oral, BID  •  carvedilol (COREG) tab 12.5 mg, 12.5 mg, Oral, BI 07/22/21  0657 07/23/21  0729 07/24/21  0719   * 87 85   BUN 44* 47* 48*   CREATSERUM 1.27* 1.48* 1.34*   GFRAA 42* 35* 39*   GFRNAA 36* 30* 34*   CA 8.5 8.8 8.5    131* 129*   K 4.3 4.6 4.6    103 100   CO2 20.0* 20.0* 20.0*         Sara

## 2021-07-25 NOTE — CONSULTS
[facility]   G[facility]ology Consultation Note    Sally Love  Patient Status:    Inpatient  Date of Admission:         7/20/2021, Hospital day #5  Attending:   Prince Holbrook MD  PCP:     Alex Hatch MD    Reason for Co suppository 10 mg, 10 mg, Rectal, Daily PRN  •  melatonin cap/tab 5 mg, 5 mg, Oral, Nightly PRN  •  cefTRIAXone Sodium (ROCEPHIN) 1 g in sodium chloride 0.9% 100 mL IVPB-ADDV, 1 g, Intravenous, Q24H  •  iron sucrose (VENOFER) IV Push 200 mg, 200 mg, Geryl Hunt tablet, Rfl: 0, 7/20/2021 at Unknown time  carvedilol 12.5 MG Oral Tab, Take 1 tablet (12.5 mg total) by mouth 2 (two) times daily with meals. , Disp: 60 tablet, Rfl: 0, 7/20/2021 at Unknown time  gabapentin 300 MG Oral Cap, Take by mouth nightly.  , Disp: old female w/ a history of a large hiatal hernia with prior Duong erosions, chronic anemia, hypertension, CVA, atrial fibrillation on Eliquis, who was brought in from rehab facility for altered mental status.   GI consulted for acute on chronic anemia req Gastroenterology  7/25/2021    This note was partially prepared using [employer] voice recognition dictation software. As a result, errors may occur. When identified, these errors have been corrected.  While every attempt is made to correct errors angelica

## 2021-07-25 NOTE — PROGRESS NOTES
[facility]    Progress Note      Subjective:     C/o left shoulder pain - no sob or cp     Review of Systems:     Constitutional: negative for fever  Eyes: negative for irritation, redness and visual disturbance  Respiratory: negative for Q24H  iron sucrose (VENOFER) IV Push 200 mg, 200 mg, Intravenous, Daily  acetaminophen (TYLENOL) tab 650 mg, 650 mg, Oral, Q6H PRN  ondansetron HCl (ZOFRAN) injection 4 mg, 4 mg, Intravenous, Q6H PRN  Metoclopramide HCl (REGLAN) injection 5 mg, 5 mg, Intra Protime value) should be utilized for   the monitoring of oral anticoagulant therapy. Recommended therapeutic ranges for anticoagulant therapy are   as follows:   2.0 - 3.0 All indications except for mechanical prosthetic   cardiac valves. 2.5 - 3. Gita Kang MD  7/25/2021

## 2021-07-25 NOTE — PLAN OF CARE
Patient stable, all needs attended to. Critical hemoglobin level this morning, 1 unit of PRBC given, consent obtained and daughter Nena at bedside all day today. Patient tolerated transfusion well, no reaction noted.  Repeat hgb done 45 minutes post tr behaviors that affect risk of falls.   - Levels fall precautions as indicated by assessment.  - Educate pt/family on patient safety including physical limitations  - Instruct pt to call for assistance with activity based on assessment  - Modify environme INTERVENTIONS:  - Monitor vital signs, rhythm, and trends  - Monitor for bleeding, hypotension and signs of decreased cardiac output  - Evaluate effectiveness of vasoactive medications to optimize hemodynamic stability  - Monitor arterial and/or venous pun

## 2021-07-25 NOTE — DIETARY NOTE
ADULT NUTRITION REASSESSMENT    RECOMMENDATIONS TO MD:  CPM    Pt is at moderate nutrition risk. Patient does meet moderate malnutrition criteria. ADMITTING DIAGNOSIS: Hyponatremia [E87. 1]Metabolic acidosis [Z50.5]  PERTINENT PAST MEDICAL HISTORY: Ensure Compact daily. Intake:  Ate toast and hardboiled egg this AM per daughter  Intake Meeting Needs: No, but oral nutrition supplements (ONS) to maximize  Percent Meals Eaten (last 3 days)     Date/Time Percent Meals Eaten (%)    07/22/21 1000  80 % 07/23/21 0444 52.4 kg (115 lb 8 oz)   07/20/21 1237 57 kg (125 lb 10.6 oz)     __________________________________________________________________    NUTRITION DIAGNOSIS/PROBLEM:  Inadequate oral intake related to physiological causes increasing needs as

## 2021-07-25 NOTE — PLAN OF CARE
Problem: Patient Centered Care  Goal: Patient preferences are identified and integrated in the patient's plan of care  Description: Interventions:  - What would you like us to know as we care for you?  \" I was recently at [facility]"  - Provide timely, co normal limits  Description: INTERVENTIONS:  - Monitor labs and rhythm and assess patient for signs and symptoms of electrolyte imbalances  - Administer electrolyte replacement as ordered  - Monitor response to electrolyte replacements, including rhythm and minimize risk of hemorrhage  - Monitor temperature, glucose, and sodium.  Initiate appropriate interventions as ordered  Outcome: Progressing     Problem: DISCHARGE PLANNING  Goal: Discharge to home or other facility with appropriate resources  Description:

## 2021-07-25 NOTE — PROGRESS NOTES
[facility]    Progress Note    Sally Love Patient Status:  Inpatient    1928 MRN [id number]   Location [facility] 5SW/SE Attending Prince Holbrook MD   Hosp Day # 5 PCP Alex Hatch MD       Subjective:   Cat 325 mg, 325 mg, Oral, Once  •  atorvastatin (LIPITOR) tab 40 mg, 40 mg, Oral, Nightly  •  amiodarone HCl (PACERONE) tab 200 mg, 200 mg, Oral, Daily  •  Amitriptyline HCl (ELAVIL) tab 10 mg, 10 mg, Oral, Nightly  •  apixaban (ELIQUIS) tab 2.5 mg, 2.5 mg, Or 15.8* 15.9*   NEPRELIM 5.41 7.02 6.32   WBC 9.0 10.4 9.3   .0 235.0 193.0         Recent Labs   Lab 07/23/21  0729 07/24/21  0719 07/25/21  0630   GLU 87 85 97   BUN 47* 48* 55*   CREATSERUM 1.48* 1.34* 1.60*   GFRAA 35* 39* 32*   GFRNAA 30* 34* 28*

## 2021-07-25 NOTE — CM/SW NOTE
OSIEL spoke to Jabier Delgado, patient is currently not medically cleared for discharge, receiving a unit of blood today. Janelle Leiva will resubmit for new insurance auth. Plan: DC to [facility] once medically stable and auth received.      OSIEL/SHELBIE t

## 2021-07-26 NOTE — PROGRESS NOTES
[facility]     Gastroenterology Progress Note    Gomezlara Dylan Patient Status:  Inpatient    1928 MRN [id number]   Location [facility] 5SW/SE Attending Xiomara Broussard MD   Hosp Day # 6 PCP Chastity Aguirre MD with Evelio Saxena erosions in the setting of chronic anticoagulation, this is her most likely source of potential GI bleeding. At the time of initial consult, I discussed at length with her daughter the role of endoscopic evaluation.   Patient is at higher risk 07/20/2021    INR 1.57 (H) 07/20/2021    TSH 0.733 07/21/2021    DDIMER 1.29 (H) 06/02/2021    MG 2.0 07/26/2021    PHOS 3.4 07/26/2021    TROP <0.045 07/20/2021    CK 77 06/10/2021       No results found.

## 2021-07-26 NOTE — PLAN OF CARE
Problem: Patient Centered Care  Goal: Patient preferences are identified and integrated in the patient's plan of care  Description: Interventions:  - What would you like us to know as we care for you?  I live in a nursing home  - Provide timely, complete, normal limits  Description: INTERVENTIONS:  - Monitor labs and rhythm and assess patient for signs and symptoms of electrolyte imbalances  - Administer electrolyte replacement as ordered  - Monitor response to electrolyte replacements, including rhythm and discharge learning needs (meds, wound care, etc)  - Arrange for interpreters to assist at discharge as needed  - Consider post-discharge preferences of patient/family/discharge partner  - Complete POLST form as appropriate  - Assess patient's ability to be

## 2021-07-26 NOTE — PHYSICAL THERAPY NOTE
PHYSICAL THERAPY TREATMENT NOTE - INPATIENT     Room Number: 390X/381-G       Presenting Problem: hyponatremia, hyperkalemia, weakness    Problem List  Principal Problem:    Hyponatremia  Active Problems:    Metabolic acidosis    Anemia    Acute blood loss aware of patient status post session. DISCHARGE RECOMMENDATIONS  PT Discharge Recommendations: Sub-acute rehabilitation     PLAN  PT Treatment Plan: Bed mobility; Body mechanics; Endurance; Patient education;Gait training;Strengthening;Transfer training; Ba session/findings; All patient questions and concerns addressed; Alarm set    CURRENT GOALS     Goals to be met by: 8/5/21  Patient Goal Patient's self-stated goal is: get stronger and better.     Goal #1 Patient is able to demonstrate supine - sit EOB @ level

## 2021-07-26 NOTE — PROGRESS NOTES
[place] [facility] - [facility]    Progress Note    Traci Hubbard Patient Status:  Inpatient    1928 MRN [id number]   Location [facility] 5SW/SE Attending Chica Wlison MD   Hosp Day # 6 PCP Jason Gale MD       Subjective:   Cat atorvastatin (LIPITOR) tab 40 mg, 40 mg, Oral, Nightly  •  amiodarone HCl (PACERONE) tab 200 mg, 200 mg, Oral, Daily  •  Amitriptyline HCl (ELAVIL) tab 10 mg, 10 mg, Oral, Nightly  •  apixaban (ELIQUIS) tab 2.5 mg, 2.5 mg, Oral, BID  •  carvedilol (COREG) 6. 5* 9.4* 8.6*   HCT 20.7*   < > 19.9* 28.9* 26.3*   MCV 85.2  --  86.5  --  85.4   MCH 29.2  --  28.3  --  27.9   MCHC 34.3  --  32.7  --  32.7   RDW 15.8*  --  15.9*  --  17.2*   NEPRELIM 7.02  --  6.32  --  8.33*   WBC 10.4  --  9.3  --  12.1*

## 2021-07-26 NOTE — OCCUPATIONAL THERAPY NOTE
OCCUPATIONAL THERAPY TREATMENT NOTE - INPATIENT        Room Number: 970V/770-L                Problem List  Principal Problem:    Hyponatremia  Active Problems:    Metabolic acidosis    Anemia    Acute blood loss anemia    Hiatal hernia    Diverticulosis ACTIVITY TOLERANCE  Pulse: 75        BP: 115/50  BP Location: Right arm  BP Method: Automatic  Patient Position: Sitting    O2 SATURATIONS  Oxygen Therapy  SPO2% on Oxygen at Rest: 94  At rest oxygen flow (liters per minute): 4  SPO2% Ambulation on Ox

## 2021-07-26 NOTE — PROGRESS NOTES
[facility]  Nephrology Daily Progress Note    Maya Dawson  [id number]  80year old      HPI:   Maya Dawson is a 80year old female. Up in chair. C/O back pain but otherwise OK. PO intake fair.       ROS:     Constitutional Results   Component Value Date    WBC 12.1 07/26/2021    HGB 8.6 07/26/2021    HCT 26.3 07/26/2021    .0 07/26/2021    CREATSERUM 1.55 07/26/2021    BUN 54 07/26/2021     07/26/2021    K 5.2 07/26/2021     07/26/2021    CO2 18.0 07/26/20 Q8H PRN  •  aspirin tab 325 mg, 325 mg, Oral, Once  •  atorvastatin (LIPITOR) tab 40 mg, 40 mg, Oral, Nightly  •  amiodarone HCl (PACERONE) tab 200 mg, 200 mg, Oral, Daily  •  Amitriptyline HCl (ELAVIL) tab 10 mg, 10 mg, Oral, Nightly  •  apixaban Clayton Ellsion

## 2021-07-26 NOTE — PLAN OF CARE
Problem: Patient Centered Care  Goal: Patient preferences are identified and integrated in the patient's plan of care  Description: Interventions:  - What would you like us to know as we care for you?   - Provide timely, complete, and accurate informatio INTERVENTIONS:  - Monitor labs and rhythm and assess patient for signs and symptoms of electrolyte imbalances  - Administer electrolyte replacement as ordered  - Monitor response to electrolyte replacements, including rhythm and repeat lab results as appro hemorrhage  - Monitor temperature, glucose, and sodium.  Initiate appropriate interventions as ordered  Outcome: Progressing     Problem: DISCHARGE PLANNING  Goal: Discharge to home or other facility with appropriate resources  Description: INTERVENTIONS:

## 2021-07-27 NOTE — PROGRESS NOTES
[facility]    Progress Note    Eliane Lew Patient Status:  Inpatient    1928 MRN [id number]   Location [facility] 5SW/SE Attending Gilberto Doyle MD   Hosp Day # 7 PCP Ilana Melchor MD       Subjective:   Cat 325 mg, 325 mg, Oral, Once  •  atorvastatin (LIPITOR) tab 40 mg, 40 mg, Oral, Nightly  •  amiodarone HCl (PACERONE) tab 200 mg, 200 mg, Oral, Daily  •  Amitriptyline HCl (ELAVIL) tab 10 mg, 10 mg, Oral, Nightly  •  apixaban (ELIQUIS) tab 2.5 mg, 2.5 mg, Or workup      Results:     Recent Labs   Lab 07/25/21  0630 07/25/21  0630 07/25/21  1455 07/26/21  0740 07/27/21  0645   RBC 2.30*  --   --  3.08* 3.01*   HGB 6.5*   < > 9.4* 8.6* 8.6*   HCT 19.9*   < > 28.9* 26.3* 25.9*   MCV 86.5  --   --  85.4 86.0   MCH

## 2021-07-27 NOTE — PHYSICAL THERAPY NOTE
PHYSICAL THERAPY TREATMENT NOTE - INPATIENT     Room Number: 367D/124-V       Presenting Problem: hyponatremia, hyperkalemia, weakness    Problem List  Principal Problem:    Hyponatremia  Active Problems:    Metabolic acidosis    Anemia    Acute blood loss Standing: Poor  Dynamic Standing: Poor -    ACTIVITY TOLERANCE                         O2 WALK  Oxygen Therapy  SPO2% on Oxygen at Rest: 95  At rest oxygen flow (liters per minute): 4  SPO2% Ambulation on Oxygen: 95  Ambulation oxygen flow (liters per steven Mod A x2    Goal #3 Patient is able to ambulate 100 feet with assist device: walker - rolling at assistance level: supervision   Goal #3   Current Status NT   Goal #4    Goal #4   Current Status    Goal #5 Patient to demonstrate independence with home acti

## 2021-07-27 NOTE — CONSULTS
[address] [facility] Patient Status:  Inpatient    1928 MRN [id number]   Location [facility] 5SW/SE Attending Fidel Tavarez MD   Hosp Day # 7 PCP Tyler Elizabeth MD     Date of Admissi Patient was transfused with appropriate response after 1 unit PRBC. Cardiology was consulted to evaluate for need for long-term anticoagulation given her anemia. GI service saw the patient no plans for endoscopy.  Patient refused invasive evaluation and edgar (PEPCID) tab 20 mg, 20 mg, Oral, Daily PRN  ferrous sulfate EC tab 325 mg, 325 mg, Oral, Daily with breakfast  gabapentin (NEURONTIN) cap 300 mg, 300 mg, Oral, Nightly  HYDROcodone-acetaminophen (NORCO) 5-325 MG per tab 1 tablet, 1 tablet, Oral, Q4H PRN temperature 97.4 °F (36.3 °C), temperature source Oral, resp. rate 16, weight 120 lb 11.2 oz (54.7 kg), SpO2 95 %.     Scheduled Meds:   • pantoprazole (PROTONIX) IV push  40 mg Intravenous Q12H   • cefTRIAXone  1 g Intravenous Q24H   • aspirin  325 mg Oral MD  [address] [place] [address]  Interventional Cardiology  7/27/2021

## 2021-07-27 NOTE — PROGRESS NOTES
[facility]  Nephrology Daily Progress Note    Pearl Lanza  [id number]  80year old      HPI:   Pearl Lanza is a 80year old female. Feeling OK. Just very weak.         ROS:     Constitutional:  Negative for decreased [place] WBC 10.1 07/27/2021    HGB 8.6 07/27/2021    HCT 25.9 07/27/2021    .0 07/27/2021    CREATSERUM 1.43 07/27/2021    BUN 53 07/27/2021     07/27/2021    K 4.8 07/27/2021     07/27/2021    CO2 19.0 07/27/2021    GLU 98 07/27/2021    CA 9.3 atorvastatin (LIPITOR) tab 40 mg, 40 mg, Oral, Nightly  •  amiodarone HCl (PACERONE) tab 200 mg, 200 mg, Oral, Daily  •  Amitriptyline HCl (ELAVIL) tab 10 mg, 10 mg, Oral, Nightly  •  apixaban (ELIQUIS) tab 2.5 mg, 2.5 mg, Oral, BID  •  carvedilol (COREG)

## 2021-07-27 NOTE — PLAN OF CARE
Problem: Patient Centered Care  Goal: Patient preferences are identified and integrated in the patient's plan of care  Description: Interventions:  - What would you like us to know as we care for you?   - Provide timely, complete, and accurate informatio INTERVENTIONS:  - Monitor labs and rhythm and assess patient for signs and symptoms of electrolyte imbalances  - Administer electrolyte replacement as ordered  - Monitor response to electrolyte replacements, including rhythm and repeat lab results as appro hemorrhage  - Monitor temperature, glucose, and sodium.  Initiate appropriate interventions as ordered  Outcome: Progressing     Problem: DISCHARGE PLANNING  Goal: Discharge to home or other facility with appropriate resources  Description: INTERVENTIONS: intake/output and perform bladder scan as needed  - Follow urinary retention protocol/standard of care  - Consider collaborating with pharmacy to review patient's medication profile  - Implement strategies to promote bladder emptying  Outcome: Progressing

## 2021-07-27 NOTE — PLAN OF CARE
Problem: SAFETY ADULT - FALL  Goal: Free from fall injury  Description: INTERVENTIONS:  - Assess pt frequently for physical needs  - Identify cognitive and physical deficits and behaviors that affect risk of falls.   - [place] fall precautions as indica Problem: MUSCULOSKELETAL - ADULT  Goal: Return mobility to safest level of function  Description: INTERVENTIONS:  - Assess patient stability and activity tolerance for standing, transferring and ambulating w/ or w/o assistive devices  - Assist with trans

## 2021-07-28 NOTE — PROGRESS NOTES
[facility]      Subjective:  No chest pain or dyspnea.   C/o right thigh pain    Objective:  /49 (BP Location: Right arm)   Pulse 72   Temp 97.4 °F (36.3 °C) (Oral)   Resp 18   Wt 113 lb 8 oz (51.5 kg)   SpO2 95%   BMI 20.76 kg/m stenosis preserved EF moderate TR PAS 51  · LE edema 2 liters UO with IV Lasix yesterday  · HTN controlled off Losartan for now, on Coreg  · EColi UTO Abx per IM    Plan:   · Another 40mg IV Lasix today, BMP AM  · Continue Amiodarone to maintain SR off [place]

## 2021-07-28 NOTE — PLAN OF CARE
Patient had a chest xray completed this morning. Patient given norco for right hip pain. Patient still requiring O2. Patient having decreased appetite but eating her meals.  This RN offered to lift the patient to the chair but the patient is declining at th medications to optimize hemodynamic stability  - Monitor arterial and/or venous puncture sites for bleeding and/or hematoma  - Assess quality of pulses, skin color and temperature  - Assess for signs of decreased coronary artery perfusion - ex.  Angina  - E physical needs  - Identify cognitive and physical deficits and behaviors that affect risk of falls.   - [place] fall precautions as indicated by assessment.  - Educate pt/family on patient safety including physical limitations  - Instruct pt to call for a techniques  - Monitor for opioid side effects  - Notify MD/LIP if interventions unsuccessful or patient reports new pain  - Anticipate increased pain with activity and pre-medicate as appropriate  Outcome: Progressing     [facility]

## 2021-07-28 NOTE — PLAN OF CARE
Problem: CARDIOVASCULAR - ADULT  Goal: Maintains optimal cardiac output and hemodynamic stability  Description: INTERVENTIONS:  - Monitor vital signs, rhythm, and trends  - Monitor for bleeding, hypotension and signs of decreased cardiac output  - Evalua INTERVENTIONS:  - Encourage pt to monitor pain and request assistance  - Assess pain using appropriate pain scale  - Administer analgesics based on type and severity of pain and evaluate response  - Implement non-pharmacological measures as appropriate and

## 2021-07-28 NOTE — PROGRESS NOTES
[place] [facility] - [facility]  Progress Note     Susie De La Torre  : 1928    Status: Inpatient  Day #: 8    Attending: Marjorie Russell MD  PCP: Omari Pina MD      Assessment and Plan     Anemia - multifactorial, iron deficient  -Likely of chronic k 24 hour   Intake 220 ml   Output 1725 ml   Net -1505 ml         Recent Labs   Lab 07/26/21  0740 07/27/21  0645 07/28/21  0620   WBC 12.1* 10.1 11.8*   HGB 8.6* 8.6* 9.1*   HCT 26.3* 25.9* 28.1*   .0 222.0 232.0   RBC 3.08* 3.01* 3.26*   MCV 85.4 86 mg Oral Once   • atorvastatin  40 mg Oral Nightly   • amiodarone  200 mg Oral Daily   • amitriptyline  10 mg Oral Nightly   • carvedilol  12.5 mg Oral BID with meals   • ferrous sulfate  325 mg Oral Daily with breakfast   • gabapentin  300 mg Oral Nightly

## 2021-07-28 NOTE — PROGRESS NOTES
[address] [facility]    CDI Prediction Tool Protocol (Vancomycin Initiated)    OVP (oral vancomycin prophylaxis) 125 mg PO Daily is being started in this patient based on a score of 15.       Score Breakdown:  High risk antibiotic use (5 points)  Hospital

## 2021-07-28 NOTE — PROGRESS NOTES
[facility]  Nephrology Daily Progress Note    Cesia Mckinney  [id number]  80year old      HPI:   Cesia Mckinney is a 80year old female. Complains of back pain with movement. Otherwise doing okay. Eating fair.   No chest pain o exam appropriate for age reflexes and motor skills appropriate for age    Labs:  Lab Results   Component Value Date    WBC 11.8 07/28/2021    HGB 9.1 07/28/2021    HCT 28.1 07/28/2021    .0 07/28/2021    CREATSERUM 1.41 07/28/2021    BUN 51 07/28/20 40 mg, 40 mg, Oral, Once  •  Pantoprazole Sodium (PROTONIX) 40 mg in Sodium Chloride (PF) 0.9 % 10 mL IV push, 40 mg, Intravenous, Q12H  •  bisacodyl (DULCOLAX) rectal suppository 10 mg, 10 mg, Rectal, Daily PRN  •  melatonin cap/tab 5 mg, 5 mg, Oral, Nigh MD

## 2021-07-29 NOTE — PHYSICAL THERAPY NOTE
PHYSICAL THERAPY TREATMENT NOTE - INPATIENT     Room Number: 785G/097-X       Presenting Problem: hyponatremia, hyperkalemia, weakness    Problem List  Principal Problem:    Hyponatremia  Active Problems:    Metabolic acidosis    Anemia    Acute blood loss at d/c to address deficits and maximize patient independence. DISCHARGE RECOMMENDATIONS  PT Discharge Recommendations: Sub-acute rehabilitation     PLAN  PT Treatment Plan: Bed mobility; Body mechanics; Energy conservation; Endurance; Patient education;Str 0  Assistive Device: Other (Comment) (HHA x 2)  Pattern: Shuffle  Stoop/Curb Assistance: Not tested  Comment : sit to stand tx after x 2 attempts at MAX A x 2; standing x 1-2 min at MAX A    THERAPEUTIC EXERCISES  Lower Extremity Alternating marching  [place]

## 2021-07-29 NOTE — DIETARY NOTE
ADULT NUTRITION REASSESSMENT    RECOMMENDATIONS TO MD:  CPM    Pt is at moderate nutrition risk. Patient does meet moderate malnutrition criteria. ADMITTING DIAGNOSIS: Hyponatremia [E87. 1]Metabolic acidosis [Y90.5]  PERTINENT PAST MEDICAL HISTORY: increased, therefore ONS adjusted this AM by MD. However pt reports disliking Nepro. Will adjust to Ensure Clear. Pt agreeable. FOOD INTAKE ANALYSIS:  Appetite: Varies Does consume Ensure Compact daily.   Intake: ~65% x5 meals documented since last visi past 336 hrs:   Weight   07/28/21 0635 51.5 kg (113 lb 8 oz)   07/27/21 0721 54.7 kg (120 lb 11.2 oz)   07/25/21 0600 55.9 kg (123 lb 3.2 oz)   07/24/21 0434 53.8 kg (118 lb 8 oz)   07/23/21 0444 52.4 kg (115 lb 8 oz)   07/20/21 1237 57 kg (125 lb 10.6 oz) Monitor weight & weight changes.   - Nutrition Goals: gradual wt gain as able, PO and supplement greater than 75% of needs, labs WNL and support wound healing    RD will follow up.      Soraya Cisneros Zac[address], [address] [facility], / Nestor Kenyon

## 2021-07-29 NOTE — PLAN OF CARE
Patient alert and oriented x4. Resting in bed. Complains of R hip pain with movement, treated per MAR. Vital signs stable on 3L. Resting in bed, call light in reach, bed alarm on.     Problem: Patient Centered Care  Goal: Patient preferences are identified Angina  - Evaluate fluid balance, assess for edema, trend weights  Outcome: Progressing     Problem: METABOLIC/FLUID AND ELECTROLYTES - ADULT  Goal: Electrolytes maintained within normal limits  Description: INTERVENTIONS:  - Monitor labs and rhythm and as neurological status  - Initiate measures to prevent increased intracranial pressure  - Maintain blood pressure and fluid volume within ordered parameters to optimize cerebral perfusion and minimize risk of hemorrhage  - Monitor temperature, glucose, and so appropriate  Outcome: Progressing     Problem: GENITOURINARY - ADULT  Goal: Absence of urinary retention  Description: INTERVENTIONS:  - Assess patient’s ability to void and empty bladder  - Monitor intake/output and perform bladder scan as needed  - [facility]IC [facility]

## 2021-07-29 NOTE — PLAN OF CARE
Problem: Patient Centered Care  Goal: Patient preferences are identified and integrated in the patient's plan of care  Description: Interventions:  - What would you like us to know as we care for you?  - Provide timely, complete, and accurate information INTERVENTIONS:  - Monitor labs and rhythm and assess patient for signs and symptoms of electrolyte imbalances  - Administer electrolyte replacement as ordered  - Monitor response to electrolyte replacements, including rhythm and repeat lab results as appro hemorrhage  - Monitor temperature, glucose, and sodium.  Initiate appropriate interventions as ordered  Outcome: Progressing     Problem: DISCHARGE PLANNING  Goal: Discharge to home or other facility with appropriate resources  Description: INTERVENTIONS: intake/output and perform bladder scan as needed  - Follow urinary retention protocol/standard of care  - Consider collaborating with pharmacy to review patient's medication profile  - Implement strategies to promote bladder emptying  Outcome: Progressing

## 2021-07-29 NOTE — PROGRESS NOTES
[facility]  Progress Note     Maria Luisa Perez  : 1928    Status: Inpatient  Day #: 9    Attending: Mimi Henderson MD  PCP: Cristina Biswas MD      Assessment and Plan     Anemia - multifactorial, iron deficient  -Likely of chronic k 7/29/2021 1348  Last data filed at 7/29/2021 0031  Gross per 24 hour   Intake 160 ml   Output 1100 ml   Net -940 ml         Recent Labs   Lab 07/27/21  0645 07/28/21  0620 07/29/21  0634   WBC 10.1 11.8* 11.6*   HGB 8.6* 9.1* 9.3*   HCT 25.9* 28.1* 29.0* metoclopramide, famoTIDine, HYDROcodone-acetaminophen      Spent >35 minutes, >50% of the time counseling coordinating care. Discussed plan of care.      Kayleigh Olivia MD

## 2021-07-29 NOTE — PROGRESS NOTES
[facility]     Cardiology Progress Note    Subjective:  No chest pain or shortness of breath.  On 2LNC    Objective:  /45 (BP Location: Right arm)   Pulse 71   Temp 97.4 °F (36.3 °C) (Oral)   Resp 18   Wt 113 lb 8 oz (51.5 kg)   SpO2 9

## 2021-07-29 NOTE — OCCUPATIONAL THERAPY NOTE
OCCUPATIONAL THERAPY TREATMENT NOTE - INPATIENT        Room Number: 541F/358-N  Presenting Problem:  (hyponatremia)    Problem List  Principal Problem:    Hyponatremia  Active Problems:    Metabolic acidosis    Anemia    Acute blood loss anemia    Hiatal h Techniques:  Activity promotion;Repositioning     ACTIVITY TOLERANCE  Fair    O2 SATURATIONS  1L  94%     ACTIVITIES OF DAILY LIVING ASSESSMENT  AM-PAC ‘6-Clicks’ Inpatient Daily Activity Short Form  How much help from another person does the patient león

## 2021-07-29 NOTE — PROGRESS NOTES
[facility]    Progress Note      Subjective:     Sitting comfortably - no cp or sob.  Some ankle swelling     Review of Systems:     Constitutional: negative for fever  Eyes: negative for irritation, redness and visual disturbance  Respira PRN  Metoclopramide HCl (REGLAN) injection 5 mg, 5 mg, Intravenous, Q8H PRN  aspirin tab 325 mg, 325 mg, Oral, Once  atorvastatin (LIPITOR) tab 40 mg, 40 mg, Oral, Nightly  amiodarone HCl (PACERONE) tab 200 mg, 200 mg, Oral, Daily  Amitriptyline HCl (ELAVI 07/27/21  0645 07/28/21  0620 07/29/21  0634   PHOS 3.0 3.3 3.7   ALB 1.8* 1.8* 1.7*       XR CHEST AP PORTABLE  (CPT=71045)    Result Date: 7/28/2021  CONCLUSION:   Extensive patchy upper lobe predominant ground-glass opacities, progressed from prior.   Fi occut blood   - GI on consult - no scope planned  - PPI   - off of AC    Discussed with Nursing, Dr. Bri Grijalva and family at bedside     Ariane Noriega MD  7/29/2021

## 2021-07-30 NOTE — PLAN OF CARE
Patient more drowsy today per daughter, Bam Caldera, following commands, able to state name/birthday/location, unaware of time and situation. Dr. Kelley Champ aware. Fluid Restriction followed of 1500ml.  Up in chair today x1 with 2 person moderate stand/pivot assistan measures to prevent increased intracranial pressure  - Maintain blood pressure and fluid volume within ordered parameters to optimize cerebral perfusion and minimize risk of hemorrhage  - Monitor temperature, glucose, and sodium.  Initiate appropriate inter

## 2021-07-30 NOTE — PROGRESS NOTES
[place] [facility]  Progress Note     Alexandra Burt  : 1928    Status: Inpatient  Day #: 10    Attending: Jason Alvarez MD  PCP: Mac Flores MD      Assessment and Plan     Anemia - multifactorial, iron deficient  -Likely of chronic calm and appropriate    Intake/Output Summary (Last 24 hours) at 7/30/2021 0935  Last data filed at 7/30/2021 0836  Gross per 24 hour   Intake 400 ml   Output 1125 ml   Net -725 ml         Recent Labs   Lab 07/27/21  0645 07/27/21  0645 07/28/21  0620 07/2

## 2021-07-30 NOTE — PROGRESS NOTES
[facility]  Nephrology Daily Progress Note    Ben Hayden  [id number]  80year old      HPI:   Ben Hayden is a 80year old female. Energy and appetitite waxes and wanes chip day to day. No SOB.         ROS:     Constituti age    Labs:  Lab Results   Component Value Date    WBC 12.1 07/30/2021    HGB 8.5 07/30/2021    HCT 25.9 07/30/2021    .0 07/30/2021    CREATSERUM 1.67 07/30/2021    BUN 70 07/30/2021     07/30/2021    K 5.7 07/30/2021     07/30/2021 Intravenous, Q8H PRN  •  aspirin tab 325 mg, 325 mg, Oral, Once  •  atorvastatin (LIPITOR) tab 40 mg, 40 mg, Oral, Nightly  •  amiodarone HCl (PACERONE) tab 200 mg, 200 mg, Oral, Daily  •  Amitriptyline HCl (ELAVIL) tab 10 mg, 10 mg, Oral, Nightly  •  carv

## 2021-07-30 NOTE — PLAN OF CARE
Patient alert and oriented x4. Resting in bed. Complains of R hip pain with movement, treated per MAR. Vital signs stable on Room air. Resting in bed, call light in reach, bed alarm on.     Problem: Patient Centered Care  Goal: Patient preferences are ident Evaluate fluid balance, assess for edema, trend weights  Outcome: Progressing     Problem: METABOLIC/FLUID AND ELECTROLYTES - ADULT  Goal: Electrolytes maintained within normal limits  Description: INTERVENTIONS:  - Monitor labs and rhythm and assess patie status  - Initiate measures to prevent increased intracranial pressure  - Maintain blood pressure and fluid volume within ordered parameters to optimize cerebral perfusion and minimize risk of hemorrhage  - Monitor temperature, glucose, and sodium.  Initiat appropriate  Outcome: Progressing     Problem: GENITOURINARY - ADULT  Goal: Absence of urinary retention  Description: INTERVENTIONS:  - Assess patient’s ability to void and empty bladder  - Monitor intake/output and perform bladder scan as needed  - [facility]IC [facility] AT [facility]

## 2021-07-31 NOTE — PROGRESS NOTES
[place] [facility] - [facility]  Progress Note     Maya Dawson  : 1928    Status: Inpatient  Day #: 11    Attending: Jairon Logan MD  PCP: Yaya Mayen MD      Assessment and Plan     Anemia - multifactorial, iron deficient  -Likely of chronic bilaterally, respirations unlabored  Gastrointestinal:  Soft, non-tender, normal bowel sounds  Musculoskeletal:  No joint swelling  Extremities:  +b/l LE edema  Neurologic:  nonfocal  Psychiatric:  Normal affect, calm and appropriate  No intake or output d PRN Meds: traMADol, bisacodyl, melatonin, acetaminophen, ondansetron, metoclopramide, famoTIDine      Spent >35 minutes, >50% of the time counseling coordinating care. Discussed plan of care.      Jeremie Andrade MD

## 2021-07-31 NOTE — SLP NOTE
ADULT SWALLOWING EVALUATION    ASSESSMENT    ASSESSMENT/OVERALL IMPRESSION:  PPE: DROPLET MASK AND GLOVES. HAND SANITIZED UPON ENTRANCE/EXIT. SLP BSSE orders received and acknowledged.  A swallow evaluation warranted as pt reportedly coughs at times dur Recommendations/Plan: Undetermined    HISTORY   MEDICAL HISTORY  Reason for Referral: Other (Comment) (\"coughing with swallowing at times\")    Problem List  Principal Problem:    Hyponatremia  Active Problems:    Metabolic acidosis    Anemia    Acute blo Functional Limits    Voice Quality: Clear  Respiratory Status: Unlabored; Supplemental O2;Nasal cannula (1L)  Consistencies Trialed: Thin liquids; Hard solid  Method of Presentation: Self presentation;Cup;Straw;Single sips; Consecutive swallows  Patient Posit

## 2021-07-31 NOTE — PHYSICAL THERAPY NOTE
PHYSICAL THERAPY TREATMENT NOTE - INPATIENT     Room Number: 817S/242-K       Presenting Problem: hyponatremia, hyperkalemia, weakness    Problem List  Principal Problem:    Hyponatremia  Active Problems:    Metabolic acidosis    Anemia    Acute blood loss re-educate;Range of motion;Strengthening;Transfer training;Balance training    SUBJECTIVE  \"I am so weak it is so hard. \"     OBJECTIVE  Precautions: Bed/chair alarm    WEIGHT BEARING RESTRICTION  Weight Bearing Restriction: None                PAIN ASSES session/findings; All patient questions and concerns addressed; Alarm set    CURRENT GOALS   Goals to be met by: 8/5/21  Patient Goal Patient's self-stated goal is: get stronger and better.     Goal #1 Patient is able to demonstrate supine - sit EOB @ level:

## 2021-07-31 NOTE — PROGRESS NOTES
[facility]  Nephrology Daily Progress Note    Soco Donaldson  [id number]  80year old      HPI:   Soco Donaldson is a 80year old female. Feels OK just weak. Ate well at lunch today.        ROS:     Constitutional:  Negative for Results   Component Value Date    WBC 11.1 07/31/2021    HGB 8.0 07/31/2021    HCT 25.3 07/31/2021    .0 07/31/2021    CREATSERUM 1.63 07/31/2021    BUN 65 07/31/2021     07/31/2021    K 4.7 07/31/2021     07/31/2021    CO2 24.0 07/31/20 Intravenous, Q8H PRN  •  atorvastatin (LIPITOR) tab 40 mg, 40 mg, Oral, Nightly  •  amiodarone HCl (PACERONE) tab 200 mg, 200 mg, Oral, Daily  •  Amitriptyline HCl (ELAVIL) tab 10 mg, 10 mg, Oral, Nightly  •  carvedilol (COREG) tab 12.5 mg, 12.5 mg, Oral,

## 2021-07-31 NOTE — PLAN OF CARE
Problem: Patient Centered Care  Goal: Patient preferences are identified and integrated in the patient's plan of care  Description: Interventions:  - What would you like us to know as we care for you?  From [facility] rehab  - Provide timely, complete, and limits  Description: INTERVENTIONS:  - Monitor labs and rhythm and assess patient for signs and symptoms of electrolyte imbalances  - Administer electrolyte replacement as ordered  - Monitor response to electrolyte replacements, including rhythm and repeat minimize risk of hemorrhage  - Monitor temperature, glucose, and sodium.  Initiate appropriate interventions as ordered  Outcome: Progressing     Problem: DISCHARGE PLANNING  Goal: Discharge to home or other facility with appropriate resources  Description: Monitor intake/output and perform bladder scan as needed  - Follow urinary retention protocol/standard of care  - Consider collaborating with pharmacy to review patient's medication profile  - Implement strategies to promote bladder emptying  Outcome: Prog

## 2021-08-01 NOTE — OCCUPATIONAL THERAPY NOTE
OCCUPATIONAL THERAPY TREATMENT NOTE - INPATIENT        Room Number: 275F/161-N    Presenting Problem:  (hyponatremia)    Problem List  Principal Problem:    Hyponatremia  Active Problems:    Metabolic acidosis    Anemia    Acute blood loss anemia    Hiatal look\"- referring to pt feeling upset at the appearance of her lower legs/feet    OBJECTIVE  Precautions: Bed/chair alarm    WEIGHT BEARING RESTRICTION  Weight Bearing Restriction: None    PAIN ASSESSMENT  Rating:  (no pain reported)  Location:  (R buttock in performance since initial eval    Patient will complete toileting with CGA   Comment: Not addressed    Patient will tolerate standing for 3-5 minutes in prep for adls with CGA    Comment:  Not met with pt demo a decline in function since initial eval

## 2021-08-01 NOTE — PROGRESS NOTES
[facility]  Nephrology Daily Progress Note    Trinh Dsouza  [id number]  80year old      HPI:   Trinh Dsouza is a 80year old female. Feeling OK. No better no worse.        ROS:     Constitutional:  Negative for decreased ac appropriate for age    Labs:  Lab Results   Component Value Date    WBC 11.0 08/01/2021    HGB 8.1 08/01/2021    HCT 24.5 08/01/2021    .0 08/01/2021    CREATSERUM 1.57 08/01/2021    BUN 64 08/01/2021     08/01/2021    K 4.5 08/01/2021    CL 1 atorvastatin (LIPITOR) tab 40 mg, 40 mg, Oral, Nightly  •  amiodarone HCl (PACERONE) tab 200 mg, 200 mg, Oral, Daily  •  Amitriptyline HCl (ELAVIL) tab 10 mg, 10 mg, Oral, Nightly  •  carvedilol (COREG) tab 12.5 mg, 12.5 mg, Oral, BID with meals  •  famoT

## 2021-08-01 NOTE — PROGRESS NOTES
[place] [facility]  Progress Note     Aleisha Walker  : 1928    Status: Inpatient  Day #: 12    Attending: Abdirizak Pereira MD  PCP: Ro Rondon MD      Assessment and Plan     Anemia - multifactorial, iron deficient  -Likely of chronic bowel sounds  Musculoskeletal:  No joint swelling  Extremities:  +b/l LE edema  Neurologic:  nonfocal  Psychiatric:  Normal affect, calm and appropriate    Intake/Output Summary (Last 24 hours) at 8/1/2021 0981  Last data filed at 8/1/2021 7373  Gross per acetaminophen, ondansetron, metoclopramide, famoTIDine      Spent >35 minutes, >50% of the time counseling coordinating care. Discussed plan of care.      Chayo Cesar MD

## 2021-08-01 NOTE — PLAN OF CARE
Patient is alert and oriented, more alert today, following commands, small bm today. Up in chair using lift assistance, vss on 1L Oxygen, encouraged use of incentive spirometer. Call light within reach, alarms activated.     Problem: Patient Centered Care patient handling equipment as needed  - Ensure adequate protection for wounds/incisions during mobilization  - Obtain PT/OT consults as needed  - Advance activity as appropriate  - Communicate ordered activity level and limitations with patient/family  Out

## 2021-08-01 NOTE — PLAN OF CARE
Patient alert and oriented, forgetfull and confused at times, o2 maintained above 90 with 1 L o2, up in chair x2 with max assistance, tolerating diet, poor appetite, ramírez discontinued, purewick in place, incontinent care provided as needed.   encouraged us patient stability and activity tolerance for standing, transferring and ambulating w/ or w/o assistive devices  - Assist with transfers and ambulation using safe patient handling equipment as needed  - Ensure adequate protection for wounds/incisions during Consider cultural and social influences on pain and pain management  - Manage/alleviate anxiety  - Utilize distraction and/or relaxation techniques  - Monitor for opioid side effects  - Notify MD/LIP if interventions unsuccessful or patient reports new erika

## 2021-08-01 NOTE — PLAN OF CARE
Problem: Patient Centered Care  Goal: Patient preferences are identified and integrated in the patient's plan of care  Description: Interventions:  - What would you like us to know as we care for you?  From [facility] rehab  - Provide timely, complete, and limits  Description: INTERVENTIONS:  - Monitor labs and rhythm and assess patient for signs and symptoms of electrolyte imbalances  - Administer electrolyte replacement as ordered  - Monitor response to electrolyte replacements, including rhythm and repeat minimize risk of hemorrhage  - Monitor temperature, glucose, and sodium.  Initiate appropriate interventions as ordered  Outcome: Progressing     Problem: DISCHARGE PLANNING  Goal: Discharge to home or other facility with appropriate resources  Description: Monitor intake/output and perform bladder scan as needed  - Follow urinary retention protocol/standard of care  - Consider collaborating with pharmacy to review patient's medication profile  - Implement strategies to promote bladder emptying  Outcome: Prog

## 2021-08-02 NOTE — PROGRESS NOTES
[place] [facility] - [facility]  Progress Note     Tamanna Mcneil  : 1928    Status: Inpatient  Day #: 15    Attending: Dewayne Estrella MD  PCP: Jaya Gibbons MD      Assessment and Plan     Anemia - multifactorial, iron deficient  -Likely of chronic respirations unlabored  Gastrointestinal:  Soft, non-tender, normal bowel sounds  Musculoskeletal:  No joint swelling  Extremities:  +b/l LE edema  Neurologic:  nonfocal  Psychiatric:  Normal affect, calm and appropriate    Intake/Output Summary (Last 24 h

## 2021-08-02 NOTE — PLAN OF CARE
Patient is alet and oriented 2-3, on 1 liter of 02. Patient s on tele monitoring with no calls overnight. Patient has Megan Nicks in Place and is check void, bladder scan completed, parameters for straight cath not met. See (flowsheet).  Patient is max assist output and hemodynamic stability  Description: INTERVENTIONS:  - Monitor vital signs, rhythm, and trends  - Monitor for bleeding, hypotension and signs of decreased cardiac output  - Evaluate effectiveness of vasoactive medications to optimize hemodynamic physical limitations  - Instruct pt to call for assistance with activity based on assessment  - Modify environment to reduce risk of injury  - Provide assistive devices as appropriate  - Consider OT/PT consult to assist with strengthening/mobility  - Encou type and severity of pain and evaluate response  - Implement non-pharmacological measures as appropriate and evaluate response  - Consider cultural and social influences on pain and pain management  - Manage/alleviate anxiety  - Utilize distraction and/or

## 2021-08-02 NOTE — PROGRESS NOTES
[facility]  Nephrology Daily Progress Note    Feliz Matute  [id number]  80year old      HPI:   Feliz Matute is a 80year old female. Feels about the same.  No SOB at rest.        ROS:     Constitutional:  Negative for decreas skills appropriate for age    Labs:  Lab Results   Component Value Date    WBC 11.5 08/02/2021    HGB 7.8 08/02/2021    HCT 23.9 08/02/2021    .0 08/02/2021    CREATSERUM 1.77 08/02/2021    BUN 68 08/02/2021     08/02/2021    K 4.4 08/02/2021 PRN  •  atorvastatin (LIPITOR) tab 40 mg, 40 mg, Oral, Nightly  •  amiodarone HCl (PACERONE) tab 200 mg, 200 mg, Oral, Daily  •  Amitriptyline HCl (ELAVIL) tab 10 mg, 10 mg, Oral, Nightly  •  carvedilol (COREG) tab 12.5 mg, 12.5 mg, Oral, BID with meals  •

## 2021-08-02 NOTE — CM/SW NOTE
OSIEL following for discharge planning. Per chart review, plan is [facility] at [facility]. Updates sent via Aidin. Insurance authorization continues to be pending.     Plan: Jermaine [id number] pending insurance authorization    Addendum 8/3/2021:  OSIEL

## 2021-08-02 NOTE — PLAN OF CARE
No complaints of pain. 1L O2. Purewick intact. Bladder scan complete. Patients daughter at bedside. Confused at night, MD aware. Frequent rounding and repositioning by staff. Bed in lowest position, safety precautions in place, and call light within reach. temperature  - Assess for signs of decreased coronary artery perfusion - ex.  Angina  - Evaluate fluid balance, assess for edema, trend weights  Outcome: Progressing     Problem: METABOLIC/FLUID AND ELECTROLYTES - ADULT  Goal: Electrolytes maintained within status  Description: INTERVENTIONS  - Assess for and report changes in neurological status  - Initiate measures to prevent increased intracranial pressure  - Maintain blood pressure and fluid volume within ordered parameters to optimize cerebral perfusion pain  - Anticipate increased pain with activity and pre-medicate as appropriate  Outcome: Progressing     Problem: GENITOURINARY - ADULT  Goal: Absence of urinary retention  Description: INTERVENTIONS:  - Assess patient’s ability to void and empty bladder

## 2021-08-02 NOTE — SLP NOTE
SPEECH DAILY NOTE - INPATIENT    ASSESSMENT & PLAN   ASSESSMENT  PPE REQUIRED. THIS THERAPIST WORE GLOVES, DROPLET MASK, AND GOGGLES FOR DURATION OF TX SESSION. HANDS WASHED UPON ENTRANCE/EXIT.     SLP f/u for ongoing dysphagia tx/meal assessment per recomm sips  Medication Administration Recommendations: One pill at a time    Patient Experiencing Pain: No       Discharge Recommendations  Discharge Recommendations/Plan: Undetermined    Treatment Plan  Treatment Plan/Recommendations: Dysphagia therapy; Aspirati

## 2021-08-03 NOTE — PLAN OF CARE
No complaints of pain. Patients daughter at bedside. Wound dressing changed. Poor oral intake. Purewick intact. MD aware of UOP. Pending urine collection. Bladder scan done. Frequent rounding and repositioning by staff.  Plan for palliative meeting tomorro hematoma  - Assess quality of pulses, skin color and temperature  - Assess for signs of decreased coronary artery perfusion - ex.  Angina  - Evaluate fluid balance, assess for edema, trend weights  Outcome: Progressing     Problem: METABOLIC/FLUID AND ELECT responsible for managing their own health  - Refer to Case Management Department for coordinating discharge planning if the patient needs post-hospital services based on physician/LIP order or complex needs related to functional status, cognitive ability o perfusion and minimize risk of hemorrhage  - Monitor temperature, glucose, and sodium.  Initiate appropriate interventions as ordered  Outcome: Not Progressing     Problem: GENITOURINARY - ADULT  Goal: Absence of urinary retention  Description: INTERVENTION

## 2021-08-03 NOTE — DIETARY NOTE
ADULT NUTRITION REASSESSMENT    RECOMMENDATIONS TO MD:  CPM  Palliative care consulted. Nutrition goals pending [address] discussion and decision. Pt is at moderate nutrition risk. Patient does meet moderate malnutrition criteria.     ADMITTING DIAGNOSIS: Hy afternoon snack (parfait). Update 07/29/21: Pt with fair intake. Pt K level increased, therefore ONS adjusted this AM by MD. However pt reports disliking Nepro. Will adjust to Ensure Clear. Pt agreeable. 8/3/2021 Update:   At visit, pt is more confused 4. 9   OSMOCALC 293 296* 297*     NUTRITION RELATED PHYSICAL FINDINGS:  - NUTRITION FOCUSED PHYSICAL EXAM (NFPE):  moderate depletion body fat orbital region and moderate depletion muscle mass clavicle region, shoulder region and dorsal griggs region per ph PLAN/INTERVENTION:    NUTRITION PRESCRIPTION:   Estimated Nutrition needs: --dosing wt of 57 kg   Calories: 7247-1271 calories/day (25-28 calories per kg Actual body wt (ABW))    Protein: 68 g protein/day (1.2 g protein/kg  Actual body wt (ABW))      - Die

## 2021-08-03 NOTE — PROGRESS NOTES
According to RN marked decrease in urine output. Patient has Emily [place] and RN has not noted any urine in diaper or bed. Total Input yesterday per RN is 400 ml po  Patient can have up to 1800ml in 24 hours.   RN has been encouraging po intake but patient de

## 2021-08-03 NOTE — PROGRESS NOTES
[place] [facility] - [facility]  Progress Note     Maria Luisa Perez  : 1928    Status: Inpatient  Day #: 14    Attending: Mimi Henderson MD  PCP: Cristina Biswas MD      Assessment and Plan     Anemia - multifactorial, iron deficient  -Likely of chronic rhythm  Pulmonary:  Clear to auscultation bilaterally, respirations unlabored  Gastrointestinal:  Soft, non-tender, normal bowel sounds  Musculoskeletal:  No joint swelling  Extremities:  +b/l LE edema  Neurologic:  nonfocal  Psychiatric:  Normal affect, c coordinating care. Discussed plan of care.      Renetta Bach MD

## 2021-08-03 NOTE — PHYSICAL THERAPY NOTE
PHYSICAL THERAPY TREATMENT NOTE - INPATIENT     Room Number: 625J/902-N       Presenting Problem: hyponatremia, hyperkalemia, weakness    Problem List  Principal Problem:    Hyponatremia  Active Problems:    Metabolic acidosis    Anemia    Acute blood loss Sitting: Fair  Dynamic Sitting: Fair           Static Standing: Not tested  Dynamic Standing: Not tested    ACTIVITY TOLERANCE  Pulse: 76  Heart Rate Source: Monitor  Resp: 20  BP: 119/48  BP Location: Right arm  BP Method: Automatic  Patient Position: [facility] supervision      Goal #1   Current Status Max A   Goal #2 Patient is able to demonstrate transfers EOB to/from Chair/Wheelchair at assistance level: supervision with walker - rolling      Goal #2  Current Status NT   Goal #3 Patient is able to ambulate 100

## 2021-08-04 PROBLEM — Z71.89 ADVANCE CARE PLANNING: Status: ACTIVE | Noted: 2021-01-01

## 2021-08-04 PROBLEM — Z71.89 GOALS OF CARE, COUNSELING/DISCUSSION: Status: ACTIVE | Noted: 2021-01-01

## 2021-08-04 NOTE — PROGRESS NOTES
[facility] Pharmacy Note:  Renal Adjustment for piperacillin/tazobactam (Omari aHll)    Shahida Scherer is a 80year old patient who has been prescribed piperacillin/tazobactam (ZOSYN) 3.375 gm every 8 hrs.   The estimated creatinine clearance is 14.6 mL/min (A) (b

## 2021-08-04 NOTE — CM/SW NOTE
SW received [facility] for community palliative care, hospice referral.    SW notified [facility] liaison of [facility] for hospice information session. SW/CM to remain available for support and/or discharge planning.      Alicia Saenz, MSW, LSW  Social W

## 2021-08-04 NOTE — PLAN OF CARE
A&O x2, VS stable, no complaints of pain overnight. Slept poorly. Poor PO intake, encouraged PO fluids. Straight cath per MD order, 390 ml urine obtained, collected for urine sodium and creatinine.     Problem: Patient Centered Care  Goal: Patient preferenc perfusion - ex.  Angina  - Evaluate fluid balance, assess for edema, trend weights  Outcome: Progressing     Problem: METABOLIC/FLUID AND ELECTROLYTES - ADULT  Goal: Electrolytes maintained within normal limits  Description: INTERVENTIONS:  - Monitor labs a planning if the patient needs post-hospital services based on physician/LIP order or complex needs related to functional status, cognitive ability or social support system  Outcome: Progressing     Problem: PAIN - ADULT  Goal: Verbalizes/displays adequate interventions as ordered  Outcome: Not Progressing     Problem: GENITOURINARY - ADULT  Goal: Absence of urinary retention  Description: INTERVENTIONS:  - Assess patient’s ability to void and empty bladder  - Monitor intake/output and perform bladder scan a

## 2021-08-04 NOTE — PROGRESS NOTES
[facility]    Progress Note    Tamanna Mcneil Patient Status:  Inpatient    1928 MRN [id number]   Location [facility] 5SW/SE Attending Connie Zhao MD   Hosp Day # 13 PCP Jaay Gibbons MD       Subjective:     Pt continues to worsen  Hyponatremia  Metabolic acidosis  - Na improved  - Cosyntropin stim test with appropriate response  - Did receive 1 dose of decadron on arrival  - nephrology on consult  - holding diuretic  - ramírez removed 8/1 in prep for discharge.  So

## 2021-08-04 NOTE — CM/SW NOTE
Discussed hospice services and goals of care with daughter and granddaughter. Family on board for hospice. Daughter bringing pt to her house in [place].  Family is talking to other sons and then will find a local hospice that their friends used and will sarkis

## 2021-08-04 NOTE — CONSULTS
[address] [facility] Patient Status:  Inpatient    1928 MRN [id number]   Location [facility] 5SW/SE Attending Aniket Fernando MD   Hosp Day # 13 PCP Josue Garcia MD     Date Neurology, and Endocrine services who subsequently signed off. She is currently being followed by the Nephrology service. Recent Previous Hospitalizations/ED visits:  Patient was recently hospitalized here in June 2021 from 6/10/2021-6/21/2021.  She ini was constipated but was given a suppository that helped but caused her rectum to be sore. She says sleep is poor here in the hospital due to staff interruptions. She also admits to feeling some depression especially due to being hospitalized.      Goals of Oral, Daily with breakfast  •  gabapentin (NEURONTIN) cap 300 mg, 300 mg, Oral, Nightly  traMADol 50 MG Oral Tab, Take 1 tablet (50 mg total) by mouth every 12 (twelve) hours as needed. PEG 3350 17 g Oral Powd Pack, Take 17 g by mouth daily as needed.   HY dyspneic and tachypneic but is satting 97% on 1 liter per minute of oxygen via nasal cannula  Abdomen: somewhat distended  : + Pure Wick  Peripheral edema: ankle bilateral 1 +  Skin: Warm and dry.   General color: normal/pale  Neurologic: level of conscio okay with re-hospitalization. Nena says that her overall goal is for her mother to be \"not scared and not alone. \"    Thais Brmanasa also says that she doesn't want Ramon Blanton to go to rehab upon discharge.     Thus, we had prolonged discussion about the for a hospice informational session/evaluation; SW asked to make referral  -Provided emotional support to pt/daughter  -See above narrative for further details     Advance care planning counseling/discussion  -Code status is DNAR/Selective Treatment - rose marie

## 2021-08-04 NOTE — CONSULTS
[facility]    Report of Consultation    Ben Hayden Patient Status:  Inpatient    1928 MRN [id number]   Location [facility] 5SW/SE Attending Yamilex Burt MD   Hosp Day # 13 PCP Vani Downing MD     Date of Admi Continuous  Albumin Human (ALBUMINAR) 25 % solution 25 g, 25 g, Intravenous, Q8H  Piperacillin Sod-Tazobactam So (ZOSYN) 3.375 g in dextrose 5% 100 mL IVPB-ADDV, 3.375 g, Intravenous, 2 times per day  multivitamin (ADULT) tab 1 tablet, 1 tablet, Oral, Elliot Tab, Take 1 tablet by mouth every 4 (four) hours as needed. [DISCONTINUED] furosemide 20 MG Oral Tab, Take 1 tablet (20 mg total) by mouth twice a week.  Take on Thursdays and Sundays  [DISCONTINUED] apixaban 2.5 MG Oral Tab, Take 1 tablet (2.5 mg total) b (H) 07/20/2021    PTP 18.5 (H) 07/20/2021    TSH 0.733 07/21/2021    DDIMER 1.29 (H) 06/02/2021    MG 1.9 08/03/2021    PHOS 5.4 (H) 08/04/2021    TROP <0.045 07/20/2021    CK 77 06/10/2021         Imaging  No results found. Assessment   1.   Right upper

## 2021-08-04 NOTE — PROGRESS NOTES
[facility]  Nephrology Daily Progress Note    Maya Dawson  [id number]  80year old      HPI:   Maya Dawson is a 80year old female. Seems more alert today.   PO intake still marginal.       ROS:     Constitutional:  Negative for age    Labs:  Lab Results   Component Value Date    WBC 10.4 08/04/2021    HGB 8.0 08/04/2021    HCT 24.8 08/04/2021    .0 08/04/2021    CREATSERUM 1.91 08/04/2021    BUN 73 08/04/2021     08/04/2021    K 4.1 08/04/2021     08/04/202 (LIPITOR) tab 40 mg, 40 mg, Oral, Nightly  •  amiodarone HCl (PACERONE) tab 200 mg, 200 mg, Oral, Daily  •  Amitriptyline HCl (ELAVIL) tab 10 mg, 10 mg, Oral, Nightly  •  carvedilol (COREG) tab 12.5 mg, 12.5 mg, Oral, BID with meals  •  famoTIDine (PEPCID)

## 2021-08-04 NOTE — PLAN OF CARE
A/Ox3. Fall risk precautions appropriate for pt: bed in lowest position, call light within reach, non-skid socks. Alarm parameters appropriate for pt: bed/chair alarm on. Q2 turns. Parag hose applied, Pt refused blue boots, but BLE elvated on pillow.    and signs of decreased cardiac output  - Evaluate effectiveness of vasoactive medications to optimize hemodynamic stability  - Monitor arterial and/or venous puncture sites for bleeding and/or hematoma  - Assess quality of pulses, skin color and temperatur FALL  Goal: Free from fall injury  Description: INTERVENTIONS:  - Assess pt frequently for physical needs  - Identify cognitive and physical deficits and behaviors that affect risk of falls.   - Spencer fall precautions as indicated by assessment.  - Educ pain management  - Manage/alleviate anxiety  - Utilize distraction and/or relaxation techniques  - Monitor for opioid side effects  - Notify MD/LIP if interventions unsuccessful or patient reports new pain  - Anticipate increased pain with activity and pre

## 2021-08-05 NOTE — PLAN OF CARE
Dr. Kina Mcdonald called-informed patient tachypneic, respirations shallow and labored, informed patient was tachypneic last night and Dr. Kathi Momin was called. Dr. Kina Mcdonald informed respirations now 24(sl.  Improved since last night, patient getting IV fluids at 50ml/hr

## 2021-08-05 NOTE — PLAN OF CARE
Pt c/o shortness of breath. T 97.4 oral, O2 sat 87% on 1L of O2, 95% on 3L O2, RR 28, HR 75, /48. Mild crackles noted to RUL, no wheezing present. Pt receiving 0.9% normal saline at 50 ml/hr.  Dr. Gill Friendly notified, maintain O2 sat 90% or higher, 3L O2

## 2021-08-05 NOTE — PLAN OF CARE
Dr. Leon Kinney called-patient is c/o shortness of breath, she states she wants more oxygen(currently on 3L/NC, sat now :96%, HR: 79, resp: 24-26, BP:144/49, temp:97.3); patient very anxious. Dr. Leon Kinney stated to hold IV fluids.   This AM BNP level changed to stat

## 2021-08-05 NOTE — PROGRESS NOTES
[facility]     Progress Note        Johanna Grubbs Patient Status:  Inpatient    1928 MRN [id number]   Location [facility] 5SW/SE Attending Odette Duncan MD   Hosp Day # 12 PCP Jewel Gaucher, MD       Subjective: (COREG) tab 12.5 mg, 12.5 mg, Oral, BID with meals  famoTIDine (PEPCID) tab 20 mg, 20 mg, Oral, Daily PRN  ferrous sulfate EC tab 325 mg, 325 mg, Oral, Daily with breakfast  gabapentin (NEURONTIN) cap 300 mg, 300 mg, Oral, Nightly        Continuous Infusio -Patient initially presented with evidence of fatigue, malaise. Chest x-ray on 2021-07-28 with right upper lobe infiltrate and interstitial markings seen.  -Had been on course of Rocephin which has been completed. Now started on Zosyn.   Increasing ox

## 2021-08-05 NOTE — HOSPICE RN NOTE
[facility] came to see the patient and talk with her daughter at bedside. Daughter would like to see how the antibiotics do today. She said she wants to speak with Dr Davin Sun and asked us to stop by later. POC discussed with Wilmer Farr.

## 2021-08-05 NOTE — PLAN OF CARE
A/O x2-3, no complaints of pain. Slept poorly. Pt c/o shortness of breath, IV Lasix given once per MD order. Pt continues on 3L O2 via nasal cannula, O2 sat 95-98%. Albumin given.  Fall precautions in place: call light within reach, bed locked and in lowest INTERVENTIONS:  - Monitor labs and rhythm and assess patient for signs and symptoms of electrolyte imbalances  - Administer electrolyte replacement as ordered  - Monitor response to electrolyte replacements, including rhythm and repeat lab results as appro hemorrhage  - Monitor temperature, glucose, and sodium.  Initiate appropriate interventions as ordered  Outcome: Progressing     Problem: DISCHARGE PLANNING  Goal: Discharge to home or other facility with appropriate resources  Description: INTERVENTIONS: daily  - Provide frequent reorientation  - Promote wakefulness i.e. lights on, blinds open  - Promote sleep, encourage patient's normal rest cycle i.e. lights off, TV off, minimize noise and interruptions  - Encourage family to assist in orientation and pr

## 2021-08-05 NOTE — PLAN OF CARE
A/Ox2,  confused, anxious and restless. Fall risk precautions appropriate for pt: bed in lowest position, call light within reach, non-skid socks. Alarm parameters appropriate for pt: bed/chair alarm on. Q2 turns.    Pt refused blue boots, but BLE elvat stability  Description: INTERVENTIONS:  - Monitor vital signs, rhythm, and trends  - Monitor for bleeding, hypotension and signs of decreased cardiac output  - Evaluate effectiveness of vasoactive medications to optimize hemodynamic stability  - Monitor ar profile  - Implement strategies to promote bladder emptying  Outcome: Not Progressing     Problem: SAFETY ADULT - FALL  Goal: Free from fall injury  Description: INTERVENTIONS:  - Assess pt frequently for physical needs  - Identify cognitive and physical d non-pharmacological measures as appropriate and evaluate response  - Consider cultural and social influences on pain and pain management  - Manage/alleviate anxiety  - Utilize distraction and/or relaxation techniques  - Monitor for opioid side effects  - N

## 2021-08-05 NOTE — PALLIATIVE CARE NOTE
Mike Blake Patient Status:  Inpatient    1928 MRN [id number]   Location [facility] 5SW/SE Attending Neeru Manzo MD   Hosp Day # 12 PCP Tawanna Alexis MD     Date of filed at 8/5/2021 0831  Gross per 24 hour   Intake 1359.99 ml   Output 400 ml   Net 959.99 ml       Physical Exam:  General: chronically ill, acutely ill, frail-appearing, overall uncomfortable and appears to be in some distress  Nutritional status: thin Daily  •  Amitriptyline HCl (ELAVIL) tab 10 mg, 10 mg, Oral, Nightly  •  carvedilol (COREG) tab 12.5 mg, 12.5 mg, Oral, BID with meals  •  famoTIDine (PEPCID) tab 20 mg, 20 mg, Oral, Daily PRN  •  ferrous sulfate EC tab 325 mg, 325 mg, Oral, Daily with sawyer etiology) or respiratory distress syndrome. Continued follow-up imaging recommended to ensure resolution.    Dictated by (CST): Jeramie Pride MD on 8/04/2021 at 6:29 PM     Finalized by (CST): Jeramie Pride MD on 8/04/2021 at 6:31 PM            Palliative Pe to face contact, history taking, physical examination, and >50% was spent counseling and coordinating care. Discussed today's visit with Dr. Estuardo De, and RN Yossi Parker from [facility].  Also, communicated with Dr. Rosalie Galeano through

## 2021-08-05 NOTE — PROGRESS NOTES
[facility]    Progress Note    Paulo Spatz Patient Status:  Inpatient    1928 MRN [id number]   Location [facility] 5SW/SE Attending Thu Bueno MD   Hosp Day # 12 PCP Shyam Choudhary MD       Subjective:   Pt Assessment and Plan:     Anemia - multifactorial, iron deficient  - Likely of chronic kidney disease.  Also with known higinio erosions in the setting of eliquis use  - FOBT positive, GI on consult - deferring endoscopy for now  - Cont to monitor with >50% of time spent on counseling and coordination of care. Juliana Wright.  Sherly Adan MD, 08/05/21

## 2021-08-06 NOTE — PLAN OF CARE
Problem: Patient Centered Care  Goal: Patient preferences are identified and integrated in the patient's plan of care  Description: Interventions:  - What would you like us to know as we care for you?  From [facility] rehab  - Provide timely, complete, and limits  Description: INTERVENTIONS:  - Monitor labs and rhythm and assess patient for signs and symptoms of electrolyte imbalances  - Administer electrolyte replacement as ordered  - Monitor response to electrolyte replacements, including rhythm and repeat minimize risk of hemorrhage  - Monitor temperature, glucose, and sodium.  Initiate appropriate interventions as ordered  Outcome: Progressing     Problem: DISCHARGE PLANNING  Goal: Discharge to home or other facility with appropriate resources  Description: Monitor intake/output and perform bladder scan as needed  - Follow urinary retention protocol/standard of care  - Consider collaborating with pharmacy to review patient's medication profile  - Implement strategies to promote bladder emptying  Outcome: Prog

## 2021-08-06 NOTE — PROGRESS NOTES
[facility]    [place] [facility]    Miguel Ewing Patient Status:  Inpatient    1928 MRN [id number]   Location [facility] 5SW/SE Attending Jd Jameson MD   Hosp Day # 12 PCP Susannah Grimaldo MD       Subjective:   Ca extremities good strength  no deformities  Extremities: no edema, cyanosis  Neurological: Fatigue    Results:     Laboratory Data:  Lab Results   Component Value Date    WBC 8.5 08/05/2021    HGB 7.0 (L) 08/05/2021    HCT 21.5 (L) 08/05/2021    .0 0 would like transfusion defer to Dr. Juanita Marin    6  Low bicarb worseing acidosis  Just watch for now  As not really critical in scheme of things. .  Would avoid na products like bicarb due to chf risk  8/5/2021  Ravin Rodriguez MD

## 2021-08-06 NOTE — PALLIATIVE CARE NOTE
Mike Blake Patient Status:  Inpatient    1928 MRN [id number]   Location [facility] 5SW/SE Attending Michael Toney MD   Hosp Day # 16 PCP Yaya Mayen MD     Date of Physical Exam:  General: chronically ill, frail-appearing, looks overall comfortable, making jokes  Nutritional status: thin  HEENT: [place], dry mucous membranes, occasional mostly non-productive cough  Chest appearance: Kyphosis  Cardiac: deferred  Gerhard ferrous sulfate EC tab 325 mg, 325 mg, Oral, Daily with breakfast  •  gabapentin (NEURONTIN) cap 300 mg, 300 mg, Oral, Nightly  traMADol 50 MG Oral Tab, Take 1 tablet (50 mg total) by mouth every 12 (twelve) hours as needed.   PEG 3350 17 g Oral Powd Pack, Evert Oglesby MD on 8/04/2021 at 6:31 PM            Palliative Performance Scale : 40%    Advance Directives:  Have advanced directives been discussed with patient or healthcare power of : Yes        Healthcare Agent Appointed: Yes  Healthcare Agent's Nam care needs over the weekend, please page Dr. David Larsen.      Aaron LARSENC, AGPCNP-BC, [facility], [id number]  8/6/2021  1:19 PM

## 2021-08-06 NOTE — PROGRESS NOTES
[facility]     Progress Note        Sreedhar Dumont Patient Status:  Inpatient    1928 MRN [id number]   Location [facility] 5SW/SE Attending Aby Ortiz MD   Hosp Day # 16 PCP Chan Bright MD       Subjective: mg, 12.5 mg, Oral, BID with meals  famoTIDine (PEPCID) tab 20 mg, 20 mg, Oral, Daily PRN  ferrous sulfate EC tab 325 mg, 325 mg, Oral, Daily with breakfast  gabapentin (NEURONTIN) cap 300 mg, 300 mg, Oral, Nightly        Continuous Infusions:   • sodium ch Currently on 2 L nasal cannula oxygen. -Repeat chest x-ray on 8/4/2021 with worsening bilateral opacities seen.  -Agreeable with as needed morphine  -Discussed with daughter. Patient's family has had discussions with palliative care and hospice.   For now

## 2021-08-06 NOTE — PLAN OF CARE
Daughter at bedside. Plan of care updated with patient and daughter. Safety measures are in place. Patient received one unit of blood today. Patient and family met with hospice today.       Problem: Patient Centered Care  Goal: Patient preferences are ident Angina  - Evaluate fluid balance, assess for edema, trend weights  Outcome: Progressing     Problem: METABOLIC/FLUID AND ELECTROLYTES - ADULT  Goal: Electrolytes maintained within normal limits  Description: INTERVENTIONS:  - Monitor labs and rhythm and as neurological status  - Initiate measures to prevent increased intracranial pressure  - Maintain blood pressure and fluid volume within ordered parameters to optimize cerebral perfusion and minimize risk of hemorrhage  - Monitor temperature, glucose, and so appropriate  Outcome: Progressing     Problem: GENITOURINARY - ADULT  Goal: Absence of urinary retention  Description: INTERVENTIONS:  - Assess patient’s ability to void and empty bladder  - Monitor intake/output and perform bladder scan as needed  - [facility]

## 2021-08-06 NOTE — PHYSICAL THERAPY NOTE
Chart review completed, per review pt with subtheraputic hgb at 6.0. RN contacted and reported pt receiving transfusion. Will hold PT at this time until pt able to safely participate.

## 2021-08-06 NOTE — PROGRESS NOTES
[facility]    Progress Note    Susie De La Torre Patient Status:  Inpatient    1928 MRN [id number]   Location [facility] 5SW/SE Attending Marixa Ching MD   Hosp Day # 16 PCP Omari Pina MD       Subjective:   Pt Assessment and Plan:     Anemia - multifactorial, iron deficient  - Likely of chronic kidney disease.  Also with known higinio erosions in the setting of eliquis use  - FOBT positive, GI on consult - deferring endoscopy for now  - Cont to monitor care.    Deepa PRATER.  Carolina Reddy MD, 08/06/21

## 2021-08-06 NOTE — CM/SW NOTE
[facility] team met with pt at bedside today. Pt more alert, no transition to hospice at this time. RH to follow up tomorrow. CM received MDO to enter Community Palliative care order if pt does not dc with Hospice.    Updates send via [employer] to

## 2021-08-06 NOTE — PROGRESS NOTES
[facility]  Nephrology Daily Progress Note    Gabriel Davies  [id number]  80year old      HPI:   Gabriel Davies is a 80year old female. Patient awake but poor p.o. intake. Weak and states she feels confused at times.   Daughter appropriate for age    Labs:  Lab Results   Component Value Date    WBC 7.4 08/06/2021    HGB 6.0 08/06/2021    HCT 18.8 08/06/2021    .0 08/06/2021    CREATSERUM 2.04 08/06/2021    BUN 65 08/06/2021     08/06/2021    K 3.5 08/06/2021    CL 10 PM              Medications:    Current Facility-Administered Medications:   •  0.9% NaCl infusion, , Intravenous, Once  •  morphINE sulfate (PF) 2 MG/ML injection 0.5 mg, 0.5 mg, Intravenous, Q4H PRN  •  0.9% NaCl infusion, , Intravenous, Continuous  •  [ edema     Acute nonintractable headache     Anemia     Acute blood loss anemia     Hiatal hernia     Diverticulosis     Hemorrhage due to chronic Duong lesion     Goals of care, counseling/discussion     Advance care planning      Urine output not accura

## 2021-08-07 NOTE — PROGRESS NOTES
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

## 2021-08-07 NOTE — HOSPICE RN NOTE
Daughter/grandaughter in room , writer introduced self to family. Still hoping to take patient home in the next couple days. Increased o2 need today. Some labored breathing, RN in room administering lasix.  Will reassess tomorrow for GIP appropriateness if

## 2021-08-07 NOTE — PLAN OF CARE
Problem: Patient Centered Care  Goal: Patient preferences are identified and integrated in the patient's plan of care  Description: Interventions:  - What would you like us to know as we care for you?  From [facility] rehab  - Provide timely, complete, and limits  Description: INTERVENTIONS:  - Monitor labs and rhythm and assess patient for signs and symptoms of electrolyte imbalances  - Administer electrolyte replacement as ordered  - Monitor response to electrolyte replacements, including rhythm and repeat minimize risk of hemorrhage  - Monitor temperature, glucose, and sodium.  Initiate appropriate interventions as ordered  Outcome: Progressing     Problem: DISCHARGE PLANNING  Goal: Discharge to home or other facility with appropriate resources  Description: Monitor intake/output and perform bladder scan as needed  - Follow urinary retention protocol/standard of care  - Consider collaborating with pharmacy to review patient's medication profile  - Implement strategies to promote bladder emptying  Outcome: Prog

## 2021-08-07 NOTE — PROGRESS NOTES
[facility]    Progress Note    Traci Hubbard Patient Status:  Inpatient    1928 MRN [id number]   Location [facility] 5SW/SE Attending hCica Winter MD   Hosp Day # 25 PCP Jason Gale MD       Subjective:   Pt and PRBCs  - Cont to monitor bp     ISMAEL - continues to worsen  Hyponatremia  Metabolic acidosis  - Na improved  - Cosyntropin stim test with appropriate response  - Did receive 1 dose of decadron on arrival  - nephrology on consult  - holding diuretic  - f

## 2021-08-08 PROBLEM — J18.9 PNEUMONIA: Status: ACTIVE | Noted: 2021-01-01

## 2021-08-08 NOTE — SLP NOTE
ADULT SWALLOWING RE-EVALUATION    ASSESSMENT    ASSESSMENT/OVERALL IMPRESSION:  PPE REQUIRED. THIS THERAPIST WORE GLOVES, DROPLET MASK, AND GOGGLES FOR DURATION OF EVALUATION. HANDS WASHED UPON ENTRANCE/EXIT. SLP BSSE orders received and acknowledged.  A with strict adherence to safe swallowing compensatory strategies. Results and recommendations reviewed with RN and family. SLP collaborated with RN for MD diet orders.    FCM SCORE: 3/7     PLAN: SLP to complete VFSS (pending family decision with GOC/hospic opacities throughout the right lung and left mid and upper lung zone.  Findings have slightly improved in the left lung.  No significant change in the right lung.  Findings suggest multifocal pneumonia. Marjorie Dimmer is also a small left    pleural effusion versu Follow-up Date: 08/09/21    Thank you for your referral.   If you have any questions, please contact Emeline Denver, RGAY [place] [id number] [facility]  Speech Language Pathologist  Phone Number Wee. [phone]

## 2021-08-08 NOTE — PROGRESS NOTES
Pt transferred from 5th floor. Lethargic, opens eyes to touch. 11L HFNC. Scope patch behind L ear. IV Morphine administered for dyspnea. Lasix PRN for secretions. Arguello intact & draining. Daughter & son at bedside coping. Will continue comfort measures.

## 2021-08-08 NOTE — PROGRESS NOTES
[facility]    Progress Note    Yeyo Echeverria Patient Status:  Inpatient    1928 MRN [id number]   Location [facility] 5SW/SE Attending Prosper Valenzuela MD   Hosp Day # 23 PCP Madelaine Bess MD     HPI/Subjective: pulmonary opacities throughout the right lung and left mid and upper lung zone. Findings have slightly improved in the left lung. No significant change in the right lung. Findings suggest multifocal pneumonia.   There is also a small left  pleural effusi

## 2021-08-08 NOTE — PLAN OF CARE
O2 increased to 8L/NC as patient anxious and sats decreased to 88%, verbalizing constantly but confused. Morphine 1mg given IVP with relief from anxiety and sats improved to 90%.   Problem: Patient Centered Care  Goal: Patient preferences are identified and Problem: METABOLIC/FLUID AND ELECTROLYTES - ADULT  Goal: Electrolytes maintained within normal limits  Description: INTERVENTIONS:  - Monitor labs and rhythm and assess patient for signs and symptoms of electrolyte imbalances  - Administer electrolyte re risk of injury  - Provide assistive devices as appropriate  - Consider OT/PT consult to assist with strengthening/mobility  - Encourage toileting schedule  Outcome: Progressing     Problem: DISCHARGE PLANNING  Goal: Discharge to home or other facility with Assess for and report changes in neurological status  - Initiate measures to prevent increased intracranial pressure  - Maintain blood pressure and fluid volume within ordered parameters to optimize cerebral perfusion and minimize risk of hemorrhage  - Mon

## 2021-08-08 NOTE — PROGRESS NOTES
[facility]    Progress Note    Eliane Lew Patient Status:  Inpatient    1928 MRN [id number]   Location [facility] 5SW/SE Attending Farheen Rosas MD   Hosp Day # 23 PCP Ilana Melchor MD     CC: hyponatremia   Jasmine 40 mEq Intravenous Once   • ipratropium-albuterol  3 mL Nebulization Q6H WA   • bumetanide  2 mg Intravenous Once   • piperacillin-tazobactam  3.375 g Intravenous 2 times per day   • multivitamin  1 tablet Oral Daily   • pantoprazole  40 mg Oral BID AC   • < > = values in this interval not displayed. Lab Results   Component Value Date    INR 1.57 (H) 07/20/2021       Culture:  Hospital Encounter on 07/20/21   1.  Urine Culture, Routine     Status: Abnormal    Collection Time: 07/20/21  2:39 PM    Speci iron deficient  - Likely of chronic kidney disease. Also with known higinio erosions in the setting of eliquis use  - FOBT positive, GI on consult - deferring endoscopy for now  - Cont to monitor Hgb, transfuse if <7. hgb stable.   - venofer given  -  Cards

## 2021-08-08 NOTE — HOSPICE RN NOTE
Writer spoke with patients daughter Na Saavedra who verbalized she would like patient to stay here with Inpatient hospice so she could receive a morphine infusion to make her more comfortable.  Writer met with patients daughter and son Dee Almanza and discussed ho

## 2021-08-08 NOTE — PLAN OF CARE
Increased o2 demands overnight, cxr is worse, elevated bnp, iv lasix given X one dose.   O2 sats from 83% to 91% on 6L      Problem: Patient Centered Care  Goal: Patient preferences are identified and integrated in the patient's plan of care  Description: I frequently for physical needs  - Identify cognitive and physical deficits and behaviors that affect risk of falls.   - [place] fall precautions as indicated by assessment.  - Educate pt/family on patient safety including physical limitations  - Instruct p Verbalizes/displays adequate comfort level or patient's stated pain goal  Description: INTERVENTIONS:  - Encourage pt to monitor pain and request assistance  - Assess pain using appropriate pain scale  - Administer analgesics based on type and severity of INTERVENTIONS:  - Assess patient stability and activity tolerance for standing, transferring and ambulating w/ or w/o assistive devices  - Assist with transfers and ambulation using safe patient handling equipment as needed  - Ensure adequate protection fo

## 2021-08-08 NOTE — PROGRESS NOTES
[facility]      Subjective:  Called by Dr. Krista Rodriguez earlier today for reevaluation of heart failure. Reviewed with daughter at bedside. Increasing work of breathing and now on 10 liters oxygen. Patient denies dyspnea.   Plans now for gabapentin  300 mg Oral Nightly     Assessment:  · PNA Abx per Dr. Corky Ortiz now requiring 10 liters oxygen  · HFpEF received Bumex 2mg IVP this morning with only 275 ml out  · ISMAEL on CKD Nephrology is following  · AF in SR on Amiodarone, off AC with anemia r

## 2021-08-08 NOTE — PLAN OF CARE
Pharmacy requesting refill for Sildenafil   Last office visit 2/8/21 for CPE  Last filled 2/8/21 #90 with 0 refills   Last labs N/A  Upcoming appointment none scheduled     Please advise.       Restless, labored breathing--oxygen between 8 and 10 L high flow. IV Bumex given, scheduled nebs, Meropenem added, zosyn stopped. Potassium covered per protocol. Morphine for dyspnea.       Problem: Patient Centered Care  Goal: Patient preferences are i minimize risk of hemorrhage  - Monitor temperature, glucose, and sodium.  Initiate appropriate interventions as ordered  8/8/2021 1516 by Emre Delcid RN  Outcome: Progressing  8/8/2021 1514 by Emre Delcid, RN  Outcome: Progressing     Problem: Jeanette Lopes appropriate  8/8/2021 1516 by Chantal Kumari RN  Outcome: Progressing  8/8/2021 1514 by Chantal Kumari RN  Outcome: Progressing     Problem: Delirium  Goal: Minimize duration of delirium  Description: Interventions:  - Encourage use of hearing aids, eye signs of decreased coronary artery perfusion - ex.  Angina  - Evaluate fluid balance, assess for edema, trend weights  8/8/2021 1516 by Yannick Hopkins, RN  Outcome: Not Progressing  8/8/2021 1514 by Yannick Hopkins, RN  Outcome: Not Progressing     Problem: David Hale, RN  Outcome: Not Progressing  8/8/2021 1514 by Efrem Ramos RN  Outcome: Not Progressing

## 2021-08-08 NOTE — PROGRESS NOTES
[facility]    Progress Note    Yeyo Echeverria Patient Status:  Inpatient    1928 MRN [id number]   Location [facility] 5SW/SE Attending Evan Shen MD   Hosp Day # 25 PCP Madelaine Bess MD       Subjective:   Ca deformities  Extremities: no edema, cyanosis  Neurological:  Grossly normal    Results:     Laboratory Data:  Lab Results   Component Value Date    WBC 10.6 08/07/2021    HGB 8.9 (L) 08/07/2021    HCT 26.2 (L) 08/07/2021    .0 08/07/2021    CREATSER

## 2021-08-08 NOTE — HOSPICE RN NOTE
Family verbalized patient has stated she would like to go home as soon as possible. They want to take her back to daughters home in [place] ( [address], [address] [address]) .  Patient is GIP appropriate but due to patients wishes writer assisting with

## 2021-08-08 NOTE — PROGRESS NOTES
[facility] Pharmacy Note:  Renal Adjustment for meropenem (MERREM)    Aleisha Walker is a 80year old patient who has been prescribed meropenem (MERREM) 500 mg every 8 hrs.   The estimated creatinine clearance is 12.6 mL/min (A) (based on SCr of 2.21 mg/dL (H

## 2021-08-08 NOTE — HOSPICE RN NOTE
Patient moved to [facility], received morphine IVP 1mg x 1 and prn dose of lasix IV scope patch applied. Son at bedside.

## 2021-08-09 NOTE — H&P
Gaetano Blake Patient Status:  Inpatient    1928 MRN [id number]   Location [facility] 4W/SW/SE Attending Julio Chen MD   Hosp Day # 1 PCP Mac Flores MD     Date:   nightly. carvedilol 12.5 MG Oral Tab, Take 1 tablet (12.5 mg total) by mouth 2 (two) times daily with meals. Mometasone Furoate 0.1 % External Cream, Apply to outer ear twice daily for 7 days  gabapentin 300 MG Oral Cap, Take by mouth nightly.     Ferrous versus pleural scar similar to prior exam. 2.  Large hiatal hernia. 3.  Stable cardiomegaly.    Dictated by (CST): Alfred Velazquez MD on 8/08/2021 at 10:46 AM     Finalized by (CST): Alfred Velazquez MD on 8/08/2021 at 10:48 AM          XR CHEST AP PORTABLE  (CPT=

## 2021-08-09 NOTE — SPIRITUAL CARE NOTE
Pt was unresponsive, daughter and granddaughter at bedside. Family shared that pt lived a long and active life, was a very caring person and has many good connections with people. She was independent, only declined recent two months.  Pt is [place], family

## 2021-08-09 NOTE — HOSPICE RN NOTE
Inpatient hospice nursing rounds. Pt is in bed and unresponsive to verbal and tactile stimuli. Pt daughter is at the bedside, hospice team provided support and active listening, no questions or concerns at this time.  Pt is on 11L high flow oxygen, morphine

## 2021-08-09 NOTE — PAYOR COMM NOTE
Discharge Notification    Patient Name: Jen GERMAIN PPO  Subscriber #: [id number]  Authorization Number: [id number]  Admit Date/Time: 7/20/2021 12:33 PM  Discharge Date/Time: 8/8/2021 5:12 PM

## 2021-08-09 NOTE — PLAN OF CARE
Patient opening eyes; unresponsive. Daughter at bedside overnight. Therapeutic communication provided; support given to family. Arguello intact and draining. Morphine gtt @ 2mg/hr. EOL/comfort care measures in place and maintained.      Problem: COPING  Goal: Provide information as requested by patient/family  - Respect patient/family right to receive or not to receive information  - Serve as a liaison between patient and family and health care team  - Initiate Consults from Ethics, Palliative Care or initiate management  - Manage/alleviate anxiety  - Utilize distraction and/or relaxation techniques  - Monitor for opioid side effects  - Notify MD/LIP if interventions unsuccessful or patient reports new pain  - Anticipate increased pain with activity and pre-medi

## 2021-08-09 NOTE — DISCHARGE SUMMARY
[facility] Discharge Summary   Patient ID:  Aleisha Walker  [id number]  58 year old  6/24/1928    Admit date: 7/20/2021  Discharge date: 8/8/2021  Primary Care Physician: Ro Rondon MD   Attending Physician: No att. providers found   C meropenem   - start duonebs q6hrs [employer] - swallow eval by ST  - video swallow on Monday. - chest physiotherapy   - BNP 22K  - cardiology consult Dr. Glendy Ascencio to see  - Bumex 2mg IV x 1 now (d/w Dr. Star Duran)   - aspiration precautions.    - Pulmonary worse. Her wish was to be home however I advised dtr she is not in a state to take Omid Sanders to [place] which will be a 40 min drive. After talking it over with mother - dtr called nad said they decided for inpt hospice. Will start morphine gtt.  Transition

## 2021-08-09 NOTE — PROGRESS NOTES
Patient's daughter and grand-daughter at bedside. Agreeable to begin morphine infusion. Morphine infusion started, titrated per order. Currently running at 2mg/hr.  Family agreeable to stop IV abx at this time and focus on maximizing EOL/comfort care measur

## 2021-08-09 NOTE — PLAN OF CARE
Problem: COPING  Goal: Pt/Family able to verbalize concerns and demonstrate effective coping strategies  Description: INTERVENTIONS:  - Assist patient/family to identify coping skills, available support systems and cultural and spiritual values  - Provid Palliative Care or initiate Family Care Conference as is appropriate  Outcome: Progressing     Problem: SPIRITUAL CARE  Goal: Pt/Family able to move forward in process of forgiving self, others and/or higher power  Description: INTERVENTIONS:  - Assist pat with activity and pre-medicate as appropriate  Outcome: Progressing   Family at beside very supportive, coping well. Patient lethargic. Morphine drip maintained at 2 mg/hr. Additional 1 mg of Morphine IVP given once after bathing. Arguello cath in place.

## 2021-08-10 NOTE — PROGRESS NOTES
This  had spoken to family previously when pt came into ED 7/20/21. Provided compassionate presence, active listening, and prayer. Completed and submitted Release Log.   RN volunteered to complete Belongings Form due to another Expiration call to

## 2021-08-10 NOTE — PLAN OF CARE
Problem: COPING  Goal: Pt/Family able to verbalize concerns and demonstrate effective coping strategies  Description: INTERVENTIONS:  - Assist patient/family to identify coping skills, available support systems and cultural and spiritual values  - Provid Palliative Care or initiate Family Care Conference as is appropriate  Outcome: Progressing     Problem: SPIRITUAL CARE  Goal: Pt/Family able to move forward in process of forgiving self, others and/or higher power  Description: INTERVENTIONS:  - Assist pat with activity and pre-medicate as appropriate  Outcome: Progressing     Problem: Patient Centered Care  Goal: Patient preferences are identified and integrated in the patient's plan of care  Description: Interventions:  - What would you like us to know as

## 2021-08-10 NOTE — HOSPICE RN NOTE
GIP   Patient not responsive to verbal or tactile stimuli  She is on Morphine drip at 2mg/hour to manage her dyspnea  Use of accessory muscles  Oxygen in place at 7 liters nasal cannula  NPO  PPS 10%  Discussed with daughter at bedside that the patient is

## 2021-08-10 NOTE — DISCHARGE SUMMARY
[facility] HOSPITALIST  DISCHARGE SUMMARY     Emiliana Morris Patient Status:  Inpatient    1928 MRN [id number]   Location [facility] 4W/SW/SE Attending Loc Vogel MD   Hosp Day # 2 PCP Radha Barragan MD     DATE OF ADMISSION: 20

## 2021-08-10 NOTE — PLAN OF CARE
Pt  at [facility]. Resusitation not attempted as pt was DNR.     Time of Death 8/10/2021 at 14:59    Family Notified yes    Name and Relation :daughter Carla Ratliff     MD Dr Dayana Reece, [address] [place] [address] AT [facility] Notified: Sharmaine Cruzito  # [phone] *    Julieta Gustafson

## 2021-08-11 ENCOUNTER — TELEPHONE (OUTPATIENT)
Dept: INTERNAL MEDICINE UNIT | Facility: HOSPITAL | Age: 86
End: 2021-08-11

## 2021-08-11 NOTE — TELEPHONE ENCOUNTER
Death certificate completed and signed by Hospitalist MD Dr. Dayana Reece. Submitted via fax to [phone]. Confirmation fax received.

## 2021-09-08 ENCOUNTER — APPOINTMENT (OUTPATIENT)
Dept: CARDIOLOGY | Age: 86
End: 2021-09-08

## (undated) DEVICE — PERIFIX® 18 GA. X 3-1/2 IN. (90 MM) TUOHY, PERIFIX 20 GA. CLOSED TIP CATHETER, 5 ML GLASS LUER LOCK LOR TRAY (KIT): Brand: PERIFIX®

## (undated) DEVICE — BANDAID COVERLET 1X3

## (undated) DEVICE — GAMMEX® PI HYBRID SIZE 7, STERILE POWDER-FREE SURGICAL GLOVE, POLYISOPRENE AND NEOPRENE BLEND: Brand: GAMMEX

## (undated) NOTE — LETTER
Hospital Discharge Documentation  Please phone to schedule a hospital follow up appointment.     From: [address] Hospitalist's Office  Phone: [phone]    Patient discharged time/date: 6/21/2021  5:10 PM  Patient discharge disposition:  [address] L>R  Neurologic:  nonfocal  Psychiatric:  Normal affect, calm and appropriate    History of Present Illness: The patient is a 43-year-old  female who was seen in the emergency room around 1 week ago for bilateral lower extremity edema.   She was g back than hip. [VS.1] Much better after SELIN[JH.1]     Mild bilat LE swelling L>R  Probable venous stasis  Pt was started on lasix for this however not tolerating the lasix as above. Now cautiously restarted per renal  Pt sees Dr. Stephen Lynch.   - appreciate card Refills: 0     carvedilol 12.5 MG Tabs  Commonly known as: COREG      Take 1 tablet (12.5 mg total) by mouth 2 (two) times daily with meals.    Quantity: 60 tablet  Refills: 0     furosemide 20 MG Tabs  Commonly known as: LASIX  Start taking on: June 24, 20 about: Should I take this medication? Take 1 tablet (200 mg total) by mouth 3 (three) times daily with meals for 5 days. On 6/21/2021 start taking 1 tablet twice daily for 7 days. On 6/28/2021 start taking 1 tablet daily.    Stop taking on: June 20, 2 Otoniel Faustin MD on 6/21/2021  5:07 PM   [facility]. 1 Tiffany Rose MD on 6/21/2021  5:06 PM   VS.1 - JOSÉ ANTONIO Castillo on 6/21/2021  1:18 PM

## (undated) NOTE — ED AVS SNAPSHOT
[facility] Emergency Department    [address]     [place] [address]    Phone:  [phone]    Fax:  [phone] [facility]   MRN: [id number]    Department:  [facility] Emergency Department   Date of Visit: and Class Registration line at [phone] or find a doctor online by visiting [email or web address].    IF THERE IS ANY CHANGE OR WORSENING OF YOUR CONDITION, CALL YOUR PRIMARY CARE PHYSICIAN AT ONCE OR RETURN IMMEDIATELY TO [address] [place] [address].     If

## (undated) NOTE — ED AVS SNAPSHOT
Feliz Matute   MRN: [id number]    Department:  [facility] Emergency Department   Date of Visit:  7/7/2018           Disclosure     Insurance plans vary and the physician(s) referred by the ER may not be covered by your plan.  Please conta within the next three months to obtain basic health screening including reassessment of your blood pressure.     IF THERE IS ANY CHANGE OR WORSENING OF YOUR CONDITION, CALL YOUR PRIMARY CARE PHYSICIAN AT ONCE OR RETURN IMMEDIATELY TO THE EMERGENCY DEPARTMEN

## (undated) NOTE — LETTER
[facility] FOR BLOOD OR BLOOD COMPONENTS      In the course of your treatment, it may become necessary to administer a transfusion of blood or blood components.  This form provides basic information concerning this proc alternatives to you if it has not already been done. I,An Blake, have read/had read to me the above. I understand the matters bearing on the decision whether or not to authorize a transfusion of blood or blood components.  I have no questions wh

## (undated) NOTE — LETTER
[address] [place] [address]      Authorization for Surgical Operation and Procedure     Date:___________                                                                                                         Time:_______ but not all, of the potential risks that can occur: fever and allergic reactions, hemolytic reactions, transmission of diseases such as Hepatitis, AIDS and Cytomegalovirus (CMV) and fluid overload.   In the event that I wish to have an autologous transfusio attending physician will determine when the applicable recovery period ends for purposes of reinstating the DNAR order.   10. Patients having a sterilization procedure: I understand that if the procedure is successful the results will be permanent and it wi _______________________________________________________________ _____________________________  Garnell Favor of Physician)                                                                                         (Date)                                   (Time)

## (undated) NOTE — ED AVS SNAPSHOT
[facility] Emergency Department    [address]     [place] [address]    Phone:  [phone]    Fax:  [phone] [facility]   MRN: [id number]    Department:  [facility] Emergency Department   Date of Visit: DIZZINESS, UNCERTAIN CAUSE (ENGLISH)    HYPONATREMIA (ENGLISH)      Disclosure     Insurance plans vary and the physician(s) referred by the ER may not be covered by your plan.  Please contact your insurance company to determine coverage and benefits avail If you have been prescribed any medication(s), please fill your prescription right away and begin taking the medication(s) as directed.   If you believe that any of the medications or instructions on this list is different from what your Primary Care doctor - If you don’t have insurance, Fredo Koo has partnered with [facility] to help you get signed up for insurance coverage.   [facility] is a Federal Navigator program that can help with your Affordable Care Act cover

## (undated) NOTE — MR AVS SNAPSHOT
[address]  [phone]               Thank you for choosing us for your health care visit with Tavon Briones MD.  We are glad to serve you and happy to provide you with this summar pain medicine when the provider removes the earwax. A number of conditions lead to earwax buildup. These include some skin problems, a narrow ear canal, or ears that make too much earwax.  Using cotton swabs in the canal pushes earwax deeper into the ear a When to seek medical advice  Call the provider right away if you have:  · Ear pain that gets worse  · Fever of 100.4F°F (38°C) or higher, or as directed by your healthcare provider  · Worsening wax buildup  · Severe pain, dizziness, or nausea  · Bleeding f information, go to [email or web address] and click on the Sign Up Now link in the [employer] box. Enter your [employer] Activation Code exactly as it appears below along with your Zip Code and Date of Birth to complete the sign-up process.  If you do

## (undated) NOTE — IP AVS SNAPSHOT
[facility]            (For Outpatient Use Only) Initial Admit Date: 6/10/2021   Inpt/Obs Admit Date: Inpt: 6/10/21 / Obs: N/A   Discharge Date:    Monet Pino:  [de-identified]   MRN: [de-identified]   CSN: [id number]   CEID: [id number]        RWJ Subscriber :    Subscriber ID:  Pt Rel to Subscriber:    Hospital Account Financial Class: Medicare Advantage    2021

## (undated) NOTE — LETTER
Hospital Discharge Documentation  Patient has transitioned to Inpatient Hospice level of care. He is still under the care of the Hospitalist team with the support of [facility].      From: [address] Hospitalist's Office  Phone: [phone] normal sinus rhythm. ProBNP 1900. Chest x-ray showed no acute findings. There is question of multifocal ground-glass opacities, but COVID testing is negative. Urinalysis showed no clear evidence of urinary tract infection.   Initially the patient arrive - BUN/Creat improving today   - renal US no hydronephrosis.      Hypokalemia  - renal on consult  - rec'd lasix   - replace.      UTI  - ucx growing >100k e.coli  - finished abx  - repeat UA ok     Paroxysmal Afib  - Cont meds  - stopped AC  - cards consul

## (undated) NOTE — LETTER
[address]  [facility] [place], [place]  [address]  INFORMED CONSENT FOR TRANSFUSION OF BLOOD OR BLOOD PRODUCTS  My physician has informed me of the nature, purpose, benefits and risks of transfusion for blood and blood components that ______________________________________________  (Signature of Patient)                                                            (Responsible party in case of Minor,

## (undated) NOTE — ED AVS SNAPSHOT
Traci Hubbard   MRN: [id number]    Department:  [facility] Emergency Department   Date of Visit:  6/23/2018           Disclosure     Insurance plans vary and the physician(s) referred by the ER may not be covered by your plan.  Please cont within the next three months to obtain basic health screening including reassessment of your blood pressure.     IF THERE IS ANY CHANGE OR WORSENING OF YOUR CONDITION, CALL YOUR PRIMARY CARE PHYSICIAN AT ONCE OR RETURN IMMEDIATELY TO THE EMERGENCY DEPARTMEN

## (undated) NOTE — ED AVS SNAPSHOT
Yeyo Echeverria   MRN: [id number]    Department:  [facility] Emergency Department   Date of Visit:  11/1/2018           Disclosure     Insurance plans vary and the physician(s) referred by the ER may not be covered by your plan.  Please cont within the next three months to obtain basic health screening including reassessment of your blood pressure.     IF THERE IS ANY CHANGE OR WORSENING OF YOUR CONDITION, CALL YOUR PRIMARY CARE PHYSICIAN AT ONCE OR RETURN IMMEDIATELY TO THE EMERGENCY DEPARTMEN

## (undated) NOTE — ED AVS SNAPSHOT
Chapin Reyes   MRN: [id number]    Department:  [facility] Emergency Department   Date of Visit:  6/21/2018           Disclosure     Insurance plans vary and the physician(s) referred by the ER may not be covered by your plan.  Please cont within the next three months to obtain basic health screening including reassessment of your blood pressure.     IF THERE IS ANY CHANGE OR WORSENING OF YOUR CONDITION, CALL YOUR PRIMARY CARE PHYSICIAN AT ONCE OR RETURN IMMEDIATELY TO THE EMERGENCY DEPARTMEN

## (undated) NOTE — Clinical Note
No referring provider defined for this encounter. 05/24/2017        Patient: Feliz Matute   YOB: 1928   Date of Visit: 5/23/2017       Dear  Dr. Jaswant Ring MD,      Thank you for referring Feliz Matute to my practice.

## (undated) NOTE — LETTER
Hospital Discharge Documentation  Patient  at [facility] under inpatient Hospice level of care. Hospitalist MD will follow up with completing death certificate once it is available.      From: [address] Hospitalist's Office  Phone: [phone]